# Patient Record
Sex: MALE | RURAL
[De-identification: names, ages, dates, MRNs, and addresses within clinical notes are randomized per-mention and may not be internally consistent; named-entity substitution may affect disease eponyms.]

---

## 2022-10-26 PROCEDURE — 88305 PATHOLOGY, DERMATOLOGY: ICD-10-PCS | Mod: 26,,, | Performed by: PATHOLOGY

## 2022-10-26 PROCEDURE — 88305 TISSUE EXAM BY PATHOLOGIST: CPT | Mod: 26,,, | Performed by: PATHOLOGY

## 2022-10-26 PROCEDURE — 88305 TISSUE EXAM BY PATHOLOGIST: CPT | Mod: SUR

## 2022-10-27 ENCOUNTER — LAB REQUISITION (OUTPATIENT)
Dept: LAB | Facility: HOSPITAL | Age: 57
End: 2022-10-27

## 2022-10-27 DIAGNOSIS — D49.2 NEOPLASM OF UNSPECIFIED BEHAVIOR OF BONE, SOFT TISSUE, AND SKIN: ICD-10-CM

## 2022-10-28 LAB
ESTROGEN SERPL-MCNC: NORMAL PG/ML
INSULIN SERPL-ACNC: NORMAL U[IU]/ML
LAB AP GROSS DESCRIPTION: NORMAL
LAB AP LABORATORY NOTES: NORMAL
LAB AP SPEC A DDX: NORMAL
LAB AP SPEC A MORPHOLOGY: NORMAL
LAB AP SPEC A PROCEDURE: NORMAL
LAB AP SPEC B DDX: NORMAL
LAB AP SPEC B MORPHOLOGY: NORMAL
LAB AP SPEC B PROCEDURE: NORMAL
T3RU NFR SERPL: NORMAL %

## 2022-11-17 PROCEDURE — 88305 TISSUE EXAM BY PATHOLOGIST: CPT | Mod: 26,,, | Performed by: PATHOLOGY

## 2022-11-17 PROCEDURE — 88305 TISSUE EXAM BY PATHOLOGIST: CPT | Mod: SUR | Performed by: DERMATOLOGY

## 2022-11-17 PROCEDURE — 88305 PATHOLOGY, DERMATOLOGY: ICD-10-PCS | Mod: 26,,, | Performed by: PATHOLOGY

## 2022-11-18 ENCOUNTER — LAB REQUISITION (OUTPATIENT)
Dept: LAB | Facility: HOSPITAL | Age: 57
End: 2022-11-18
Attending: DERMATOLOGY

## 2022-11-18 DIAGNOSIS — C44.49 OTHER SPECIFIED MALIGNANT NEOPLASM OF SKIN OF SCALP AND NECK: ICD-10-CM

## 2022-11-21 LAB
ESTROGEN SERPL-MCNC: NORMAL PG/ML
INSULIN SERPL-ACNC: NORMAL U[IU]/ML
LAB AP GROSS DESCRIPTION: NORMAL
LAB AP LABORATORY NOTES: NORMAL
LAB AP SPEC A DDX: NORMAL
LAB AP SPEC A MORPHOLOGY: NORMAL
LAB AP SPEC A PROCEDURE: NORMAL
T3RU NFR SERPL: NORMAL %

## 2023-10-26 PROCEDURE — 88305 TISSUE EXAM BY PATHOLOGIST: CPT | Mod: 26,,, | Performed by: PATHOLOGY

## 2023-10-26 PROCEDURE — 88305 TISSUE EXAM BY PATHOLOGIST: CPT | Mod: TC,SUR

## 2023-10-26 PROCEDURE — 88305 PATHOLOGY, DERMATOLOGY: ICD-10-PCS | Mod: 26,,, | Performed by: PATHOLOGY

## 2023-10-27 ENCOUNTER — LAB REQUISITION (OUTPATIENT)
Dept: LAB | Facility: HOSPITAL | Age: 58
End: 2023-10-27

## 2023-10-27 DIAGNOSIS — D49.2 NEOPLASM OF UNSPECIFIED BEHAVIOR OF BONE, SOFT TISSUE, AND SKIN: ICD-10-CM

## 2024-12-04 PROCEDURE — 88305 TISSUE EXAM BY PATHOLOGIST: CPT | Mod: TC,SUR | Performed by: DERMATOLOGY

## 2024-12-04 PROCEDURE — 88305 TISSUE EXAM BY PATHOLOGIST: CPT | Mod: 26,,, | Performed by: PATHOLOGY

## 2024-12-05 ENCOUNTER — LAB REQUISITION (OUTPATIENT)
Dept: LAB | Facility: HOSPITAL | Age: 59
End: 2024-12-05
Attending: DERMATOLOGY

## 2024-12-05 DIAGNOSIS — D04.62 CARCINOMA IN SITU OF SKIN OF LEFT UPPER LIMB, INCLUDING SHOULDER: ICD-10-CM

## 2025-02-03 ENCOUNTER — CLINICAL SUPPORT (OUTPATIENT)
Dept: CARDIOLOGY | Facility: CLINIC | Age: 60
End: 2025-02-03

## 2025-02-03 ENCOUNTER — OFFICE VISIT (OUTPATIENT)
Dept: ORTHOPEDICS | Facility: CLINIC | Age: 60
End: 2025-02-03

## 2025-02-03 ENCOUNTER — HOSPITAL ENCOUNTER (OUTPATIENT)
Dept: RADIOLOGY | Facility: HOSPITAL | Age: 60
Discharge: HOME | End: 2025-02-03
Attending: ORTHOPAEDIC SURGERY

## 2025-02-03 VITALS
HEART RATE: 93 BPM | WEIGHT: 280 LBS | BODY MASS INDEX: 34.82 KG/M2 | SYSTOLIC BLOOD PRESSURE: 132 MMHG | DIASTOLIC BLOOD PRESSURE: 87 MMHG | HEIGHT: 75 IN | OXYGEN SATURATION: 97 %

## 2025-02-03 DIAGNOSIS — S82.402A CLOSED FRACTURE OF LEFT TIBIA AND FIBULA, INITIAL ENCOUNTER: Primary | ICD-10-CM

## 2025-02-03 DIAGNOSIS — Z01.810 PRE-OPERATIVE CARDIOVASCULAR EXAMINATION: ICD-10-CM

## 2025-02-03 DIAGNOSIS — S82.202A CLOSED FRACTURE OF LEFT TIBIA AND FIBULA, INITIAL ENCOUNTER: ICD-10-CM

## 2025-02-03 DIAGNOSIS — Z01.812 PRE-OPERATIVE LABORATORY EXAMINATION: ICD-10-CM

## 2025-02-03 DIAGNOSIS — S82.142A CLOSED FRACTURE OF LEFT TIBIAL PLATEAU, INITIAL ENCOUNTER: ICD-10-CM

## 2025-02-03 DIAGNOSIS — S82.402A CLOSED FRACTURE OF LEFT TIBIA AND FIBULA, INITIAL ENCOUNTER: ICD-10-CM

## 2025-02-03 DIAGNOSIS — S82.202A CLOSED FRACTURE OF LEFT TIBIA AND FIBULA, INITIAL ENCOUNTER: Primary | ICD-10-CM

## 2025-02-03 PROCEDURE — 99999 PR PBB SHADOW E&M-EST. PATIENT-LVL II: CPT | Mod: PBBFAC,,,

## 2025-02-03 PROCEDURE — 99999 PR PBB SHADOW E&M-EST. PATIENT-LVL V: CPT | Mod: PBBFAC,,, | Performed by: ORTHOPAEDIC SURGERY

## 2025-02-03 PROCEDURE — 93010 ELECTROCARDIOGRAM REPORT: CPT | Mod: S$PBB,,, | Performed by: INTERNAL MEDICINE

## 2025-02-03 PROCEDURE — 93005 ELECTROCARDIOGRAM TRACING: CPT | Mod: PBBFAC | Performed by: INTERNAL MEDICINE

## 2025-02-03 PROCEDURE — 73700 CT LOWER EXTREMITY W/O DYE: CPT | Mod: TC,LT

## 2025-02-03 PROCEDURE — 99204 OFFICE O/P NEW MOD 45 MIN: CPT | Mod: S$PBB,,, | Performed by: ORTHOPAEDIC SURGERY

## 2025-02-03 PROCEDURE — 99212 OFFICE O/P EST SF 10 MIN: CPT | Mod: PBBFAC,25

## 2025-02-03 PROCEDURE — 73700 CT LOWER EXTREMITY W/O DYE: CPT | Mod: 26,LT,, | Performed by: RADIOLOGY

## 2025-02-03 PROCEDURE — 99215 OFFICE O/P EST HI 40 MIN: CPT | Mod: PBBFAC,25 | Performed by: ORTHOPAEDIC SURGERY

## 2025-02-03 RX ORDER — TRAMADOL HYDROCHLORIDE 50 MG/1
50 TABLET ORAL EVERY 6 HOURS
Status: ON HOLD | COMMUNITY
End: 2025-02-12 | Stop reason: HOSPADM
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
## 2025-02-03 NOTE — PATIENT INSTRUCTIONS
Your surgery is scheduled at [facility] in [place] for 2025    Pre-Op Testin2025    ___x____ Lab (Clinic Lab 1st Floor)  _______ Chest X-ray ([facility] 1st Floor)  ___x____ EKG (Clinic 2nd Floor)    *[facility] will contact you the day before surgery to give you the arrival time.    *A  is required to provide transportation for you the day of surgery.    *Do NOT eat or drink anything after midnight the night before your surgery.    *Bring all medications in their original bottles.    *Bring anything you may need for an overnight stay.     *Bathe with Hibiclens the night or morning before surgery.    *The morning of your surgery ONLY take blood pressure medications, heart medications, medications for acid reflux, and thyroid medications (the morning dose only).  *Take these medications with a sip of water.    *Do not take Insulin or Diabetic Medications the night before or the morning of your surgery, unless directed otherwise.    *Be sure that you have stopped blood thinners at the appropriate time, as instructed.  (_____ days prior to surgery)    *Bring your C-Pap machine if you have one.    *All jewelry and piercings MUST be removed prior to surgery.    *False eye lashes must be removed prior to surgery.    *Any questions regarding co-pays or deductibles with insurance, please contact the [facility] at #[phone].    *For Financial Assistance you may call #[phone] or #[phone].    Thank you for choosing Dr. Marco Culver for your Orthopedic needs.  We look forward to caring for you. If you have any questions, please contact our office at [phone].

## 2025-02-04 DIAGNOSIS — S82.142A TIBIAL PLATEAU FRACTURE, LEFT: Primary | ICD-10-CM

## 2025-02-04 LAB
OHS QRS DURATION: 110 MS
OHS QTC CALCULATION: 435 MS

## 2025-02-11 ENCOUNTER — HOSPITAL ENCOUNTER (OUTPATIENT)
Facility: HOSPITAL | Age: 60
Discharge: HOME HEALTH | End: 2025-02-12
Attending: ORTHOPAEDIC SURGERY | Admitting: ORTHOPAEDIC SURGERY

## 2025-02-11 ENCOUNTER — ANESTHESIA (OUTPATIENT)
Dept: SURGERY | Facility: HOSPITAL | Age: 60
End: 2025-02-11

## 2025-02-11 ENCOUNTER — ANESTHESIA EVENT (OUTPATIENT)
Dept: SURGERY | Facility: HOSPITAL | Age: 60
End: 2025-02-11

## 2025-02-11 DIAGNOSIS — S82.142A CLOSED FRACTURE OF LEFT TIBIAL PLATEAU: ICD-10-CM

## 2025-02-11 PROCEDURE — C1713 ANCHOR/SCREW BN/BN,TIS/BN: HCPCS | Performed by: ORTHOPAEDIC SURGERY

## 2025-02-11 PROCEDURE — 97161 PT EVAL LOW COMPLEX 20 MIN: CPT

## 2025-02-11 PROCEDURE — 99900035 HC TECH TIME PER 15 MIN (STAT)

## 2025-02-11 PROCEDURE — 27000165 HC TUBE, ETT CUFFED: Performed by: ANESTHESIOLOGY

## 2025-02-11 PROCEDURE — 63600175 PHARM REV CODE 636 W HCPCS: Performed by: ORTHOPAEDIC SURGERY

## 2025-02-11 PROCEDURE — D9220A PRA ANESTHESIA: Mod: ,,, | Performed by: ANESTHESIOLOGY

## 2025-02-11 PROCEDURE — 25000003 PHARM REV CODE 250: Performed by: ORTHOPAEDIC SURGERY

## 2025-02-11 PROCEDURE — 27000716 HC OXISENSOR PROBE, ANY SIZE: Performed by: ANESTHESIOLOGY

## 2025-02-11 PROCEDURE — C1769 GUIDE WIRE: HCPCS | Performed by: ORTHOPAEDIC SURGERY

## 2025-02-11 PROCEDURE — 63600175 PHARM REV CODE 636 W HCPCS: Performed by: NURSE ANESTHETIST, CERTIFIED REGISTERED

## 2025-02-11 PROCEDURE — 71000033 HC RECOVERY, INTIAL HOUR: Performed by: ORTHOPAEDIC SURGERY

## 2025-02-11 PROCEDURE — 36000711: Performed by: ORTHOPAEDIC SURGERY

## 2025-02-11 PROCEDURE — 36000710: Performed by: ORTHOPAEDIC SURGERY

## 2025-02-11 PROCEDURE — 71000039 HC RECOVERY, EACH ADD'L HOUR: Performed by: ORTHOPAEDIC SURGERY

## 2025-02-11 PROCEDURE — 27800903 OPTIME MED/SURG SUP & DEVICES OTHER IMPLANTS: Performed by: ORTHOPAEDIC SURGERY

## 2025-02-11 PROCEDURE — 25000003 PHARM REV CODE 250: Performed by: NURSE ANESTHETIST, CERTIFIED REGISTERED

## 2025-02-11 PROCEDURE — 94761 N-INVAS EAR/PLS OXIMETRY MLT: CPT

## 2025-02-11 PROCEDURE — 27000655: Performed by: ANESTHESIOLOGY

## 2025-02-11 PROCEDURE — 37000008 HC ANESTHESIA 1ST 15 MINUTES: Performed by: ORTHOPAEDIC SURGERY

## 2025-02-11 PROCEDURE — 27000510 HC BLANKET BAIR HUGGER ANY SIZE: Performed by: ANESTHESIOLOGY

## 2025-02-11 PROCEDURE — 63600175 PHARM REV CODE 636 W HCPCS: Mod: JZ,TB | Performed by: NURSE ANESTHETIST, CERTIFIED REGISTERED

## 2025-02-11 PROCEDURE — 27535 TREAT KNEE FRACTURE: CPT | Mod: LT,,, | Performed by: ORTHOPAEDIC SURGERY

## 2025-02-11 PROCEDURE — 94799 UNLISTED PULMONARY SVC/PX: CPT

## 2025-02-11 PROCEDURE — 27201423 OPTIME MED/SURG SUP & DEVICES STERILE SUPPLY: Performed by: ORTHOPAEDIC SURGERY

## 2025-02-11 PROCEDURE — 27000689 HC BLADE LARYNGOSCOPE ANY SIZE: Performed by: ANESTHESIOLOGY

## 2025-02-11 PROCEDURE — 37000009 HC ANESTHESIA EA ADD 15 MINS: Performed by: ORTHOPAEDIC SURGERY

## 2025-02-11 PROCEDURE — 27000177 HC AIRWAY, LARYNGEAL MASK: Performed by: ANESTHESIOLOGY

## 2025-02-11 DEVICE — SCREW BONE CORTICAL 3.5X42MM T: Type: IMPLANTABLE DEVICE | Site: LEG | Status: FUNCTIONAL

## 2025-02-11 DEVICE — IMPLANTABLE DEVICE: Type: IMPLANTABLE DEVICE | Site: LEG | Status: FUNCTIONAL

## 2025-02-11 DEVICE — SCREW BONE CORTICAL 3.5X48MM T: Type: IMPLANTABLE DEVICE | Site: LEG | Status: FUNCTIONAL

## 2025-02-11 DEVICE — SCREW AXSOS CANC FT TI 4X70MM: Type: IMPLANTABLE DEVICE | Site: LEG | Status: FUNCTIONAL

## 2025-02-11 DEVICE — SCREW AXSOS CORTEX TI 3.5X80MM: Type: IMPLANTABLE DEVICE | Site: LEG | Status: FUNCTIONAL

## 2025-02-11 DEVICE — SCREW BONE AXSOS 4X75MM TI: Type: IMPLANTABLE DEVICE | Site: LEG | Status: FUNCTIONAL

## 2025-02-11 DEVICE — SCREW BONE AXSOS 4X70MM TI: Type: IMPLANTABLE DEVICE | Site: LEG | Status: FUNCTIONAL

## 2025-02-11 RX ORDER — BISACODYL 10 MG/1
10 SUPPOSITORY RECTAL DAILY PRN
Status: DISCONTINUED | OUTPATIENT
Start: 2025-02-11 | End: 2025-02-12 | Stop reason: HOSPADM

## 2025-02-11 RX ORDER — ROCURONIUM BROMIDE 10 MG/ML
INJECTION, SOLUTION INTRAVENOUS
Status: DISCONTINUED | OUTPATIENT
Start: 2025-02-11 | End: 2025-02-11

## 2025-02-11 RX ORDER — HYDROMORPHONE HYDROCHLORIDE 2 MG/ML
INJECTION, SOLUTION INTRAMUSCULAR; INTRAVENOUS; SUBCUTANEOUS
Status: DISCONTINUED | OUTPATIENT
Start: 2025-02-11 | End: 2025-02-11

## 2025-02-11 RX ORDER — MIDAZOLAM HYDROCHLORIDE 1 MG/ML
INJECTION INTRAMUSCULAR; INTRAVENOUS
Status: DISCONTINUED | OUTPATIENT
Start: 2025-02-11 | End: 2025-02-11

## 2025-02-11 RX ORDER — PHENYLEPHRINE HYDROCHLORIDE 10 MG/ML
INJECTION INTRAVENOUS
Status: DISCONTINUED | OUTPATIENT
Start: 2025-02-11 | End: 2025-02-11

## 2025-02-11 RX ORDER — LABETALOL HYDROCHLORIDE 5 MG/ML
7.5 INJECTION, SOLUTION INTRAVENOUS ONCE
Status: COMPLETED | OUTPATIENT
Start: 2025-02-11 | End: 2025-02-11

## 2025-02-11 RX ORDER — SODIUM CHLORIDE, SODIUM LACTATE, POTASSIUM CHLORIDE, CALCIUM CHLORIDE 600; 310; 30; 20 MG/100ML; MG/100ML; MG/100ML; MG/100ML
125 INJECTION, SOLUTION INTRAVENOUS CONTINUOUS
Status: DISCONTINUED | OUTPATIENT
Start: 2025-02-11 | End: 2025-02-12

## 2025-02-11 RX ORDER — PROPOFOL 10 MG/ML
VIAL (ML) INTRAVENOUS
Status: DISCONTINUED | OUTPATIENT
Start: 2025-02-11 | End: 2025-02-11

## 2025-02-11 RX ORDER — CEFAZOLIN SODIUM 1 G/3ML
INJECTION, POWDER, FOR SOLUTION INTRAMUSCULAR; INTRAVENOUS
Status: DISCONTINUED | OUTPATIENT
Start: 2025-02-11 | End: 2025-02-11

## 2025-02-11 RX ORDER — CEFAZOLIN 2 G/1
2 INJECTION, POWDER, FOR SOLUTION INTRAMUSCULAR; INTRAVENOUS
Status: DISCONTINUED | OUTPATIENT
Start: 2025-02-11 | End: 2025-02-11 | Stop reason: HOSPADM

## 2025-02-11 RX ORDER — IPRATROPIUM BROMIDE AND ALBUTEROL SULFATE 2.5; .5 MG/3ML; MG/3ML
3 SOLUTION RESPIRATORY (INHALATION)
Status: DISCONTINUED | OUTPATIENT
Start: 2025-02-11 | End: 2025-02-11 | Stop reason: HOSPADM

## 2025-02-11 RX ORDER — ONDANSETRON HYDROCHLORIDE 2 MG/ML
4 INJECTION, SOLUTION INTRAVENOUS EVERY 8 HOURS PRN
Status: DISCONTINUED | OUTPATIENT
Start: 2025-02-11 | End: 2025-02-12 | Stop reason: HOSPADM

## 2025-02-11 RX ORDER — CEFAZOLIN 2 G/1
2 INJECTION, POWDER, FOR SOLUTION INTRAMUSCULAR; INTRAVENOUS
Status: COMPLETED | OUTPATIENT
Start: 2025-02-11 | End: 2025-02-12

## 2025-02-11 RX ORDER — MORPHINE SULFATE 10 MG/ML
4 INJECTION INTRAMUSCULAR; INTRAVENOUS; SUBCUTANEOUS EVERY 5 MIN PRN
Status: DISCONTINUED | OUTPATIENT
Start: 2025-02-11 | End: 2025-02-11 | Stop reason: HOSPADM

## 2025-02-11 RX ORDER — LIDOCAINE HYDROCHLORIDE 20 MG/ML
INJECTION INTRAVENOUS
Status: DISCONTINUED | OUTPATIENT
Start: 2025-02-11 | End: 2025-02-11

## 2025-02-11 RX ORDER — OXYCODONE HYDROCHLORIDE 5 MG/1
5 TABLET ORAL
Status: DISCONTINUED | OUTPATIENT
Start: 2025-02-11 | End: 2025-02-11 | Stop reason: HOSPADM

## 2025-02-11 RX ORDER — HYDROMORPHONE HYDROCHLORIDE 2 MG/ML
0.5 INJECTION, SOLUTION INTRAMUSCULAR; INTRAVENOUS; SUBCUTANEOUS EVERY 5 MIN PRN
Status: DISCONTINUED | OUTPATIENT
Start: 2025-02-11 | End: 2025-02-11 | Stop reason: HOSPADM

## 2025-02-11 RX ORDER — SUCCINYLCHOLINE CHLORIDE 20 MG/ML
INJECTION INTRAMUSCULAR; INTRAVENOUS
Status: DISCONTINUED | OUTPATIENT
Start: 2025-02-11 | End: 2025-02-11

## 2025-02-11 RX ORDER — SODIUM CHLORIDE 9 MG/ML
INJECTION, SOLUTION INTRAVENOUS CONTINUOUS
Status: DISCONTINUED | OUTPATIENT
Start: 2025-02-11 | End: 2025-02-12

## 2025-02-11 RX ORDER — HYDROCODONE BITARTRATE AND ACETAMINOPHEN 5; 325 MG/1; MG/1
1 TABLET ORAL EVERY 4 HOURS PRN
Status: DISCONTINUED | OUTPATIENT
Start: 2025-02-11 | End: 2025-02-12 | Stop reason: HOSPADM

## 2025-02-11 RX ORDER — FENTANYL CITRATE 50 UG/ML
INJECTION, SOLUTION INTRAMUSCULAR; INTRAVENOUS
Status: DISCONTINUED | OUTPATIENT
Start: 2025-02-11 | End: 2025-02-11

## 2025-02-11 RX ORDER — ONDANSETRON HYDROCHLORIDE 2 MG/ML
4 INJECTION, SOLUTION INTRAVENOUS DAILY PRN
Status: DISCONTINUED | OUTPATIENT
Start: 2025-02-11 | End: 2025-02-11 | Stop reason: HOSPADM

## 2025-02-11 RX ORDER — DIPHENHYDRAMINE HYDROCHLORIDE 50 MG/ML
25 INJECTION INTRAMUSCULAR; INTRAVENOUS EVERY 6 HOURS PRN
Status: DISCONTINUED | OUTPATIENT
Start: 2025-02-11 | End: 2025-02-11 | Stop reason: HOSPADM

## 2025-02-11 RX ORDER — ONDANSETRON HYDROCHLORIDE 2 MG/ML
INJECTION, SOLUTION INTRAVENOUS
Status: DISCONTINUED | OUTPATIENT
Start: 2025-02-11 | End: 2025-02-11

## 2025-02-11 RX ORDER — DEXAMETHASONE SODIUM PHOSPHATE 4 MG/ML
INJECTION, SOLUTION INTRA-ARTICULAR; INTRALESIONAL; INTRAMUSCULAR; INTRAVENOUS; SOFT TISSUE
Status: DISCONTINUED | OUTPATIENT
Start: 2025-02-11 | End: 2025-02-11

## 2025-02-11 RX ORDER — DOCUSATE SODIUM 100 MG/1
100 CAPSULE, LIQUID FILLED ORAL EVERY 12 HOURS
Status: DISCONTINUED | OUTPATIENT
Start: 2025-02-11 | End: 2025-02-12 | Stop reason: HOSPADM

## 2025-02-11 RX ORDER — SODIUM CHLORIDE 9 MG/ML
INJECTION, SOLUTION INTRAVENOUS CONTINUOUS
Status: DISCONTINUED | OUTPATIENT
Start: 2025-02-11 | End: 2025-02-12 | Stop reason: HOSPADM

## 2025-02-11 RX ORDER — MORPHINE SULFATE 4 MG/ML
4 INJECTION, SOLUTION INTRAMUSCULAR; INTRAVENOUS EVERY 4 HOURS PRN
Status: DISCONTINUED | OUTPATIENT
Start: 2025-02-11 | End: 2025-02-12 | Stop reason: HOSPADM

## 2025-02-11 RX ADMIN — ONDANSETRON 8 MG: 2 INJECTION INTRAMUSCULAR; INTRAVENOUS at 03:02

## 2025-02-11 RX ADMIN — HYDROMORPHONE HYDROCHLORIDE 0.5 MG: 2 INJECTION, SOLUTION INTRAMUSCULAR; INTRAVENOUS; SUBCUTANEOUS at 05:02

## 2025-02-11 RX ADMIN — MIDAZOLAM HYDROCHLORIDE 2 MG: 1 INJECTION, SOLUTION INTRAMUSCULAR; INTRAVENOUS at 03:02

## 2025-02-11 RX ADMIN — LIDOCAINE HYDROCHLORIDE 100 MG: 20 INJECTION, SOLUTION INTRAVENOUS at 03:02

## 2025-02-11 RX ADMIN — PHENYLEPHRINE HYDROCHLORIDE 100 MCG: 10 INJECTION INTRAVENOUS at 03:02

## 2025-02-11 RX ADMIN — HYDROMORPHONE HYDROCHLORIDE 1 MG: 2 INJECTION, SOLUTION INTRAMUSCULAR; INTRAVENOUS; SUBCUTANEOUS at 05:02

## 2025-02-11 RX ADMIN — SODIUM CHLORIDE: 9 INJECTION, SOLUTION INTRAVENOUS at 12:02

## 2025-02-11 RX ADMIN — DOCUSATE SODIUM 100 MG: 100 CAPSULE, LIQUID FILLED ORAL at 08:02

## 2025-02-11 RX ADMIN — ROCURONIUM BROMIDE 30 MG: 10 INJECTION, SOLUTION INTRAVENOUS at 03:02

## 2025-02-11 RX ADMIN — PHENYLEPHRINE HYDROCHLORIDE 200 MCG: 10 INJECTION INTRAVENOUS at 04:02

## 2025-02-11 RX ADMIN — SODIUM CHLORIDE: 9 INJECTION, SOLUTION INTRAVENOUS at 05:02

## 2025-02-11 RX ADMIN — PROPOFOL 200 MG: 10 INJECTION, EMULSION INTRAVENOUS at 03:02

## 2025-02-11 RX ADMIN — SUCCINYLCHOLINE CHLORIDE 100 MG: 20 INJECTION, SOLUTION INTRAMUSCULAR; INTRAVENOUS; PARENTERAL at 03:02

## 2025-02-11 RX ADMIN — DEXAMETHASONE SODIUM PHOSPHATE 8 MG: 4 INJECTION, SOLUTION INTRA-ARTICULAR; INTRALESIONAL; INTRAMUSCULAR; INTRAVENOUS; SOFT TISSUE at 03:02

## 2025-02-11 RX ADMIN — FENTANYL CITRATE 100 MCG: 50 INJECTION, SOLUTION INTRAMUSCULAR; INTRAVENOUS at 04:02

## 2025-02-11 RX ADMIN — LABETALOL HYDROCHLORIDE 7.5 MG: 5 INJECTION, SOLUTION INTRAVENOUS at 07:02

## 2025-02-11 RX ADMIN — FENTANYL CITRATE 100 MCG: 50 INJECTION, SOLUTION INTRAMUSCULAR; INTRAVENOUS at 03:02

## 2025-02-11 RX ADMIN — SODIUM CHLORIDE: 9 INJECTION, SOLUTION INTRAVENOUS at 07:02

## 2025-02-11 RX ADMIN — CEFAZOLIN 2 G: 2 INJECTION, POWDER, FOR SOLUTION INTRAMUSCULAR; INTRAVENOUS at 10:02

## 2025-02-11 RX ADMIN — HYDROMORPHONE HYDROCHLORIDE 0.5 MG: 2 INJECTION, SOLUTION INTRAMUSCULAR; INTRAVENOUS; SUBCUTANEOUS at 06:02

## 2025-02-11 RX ADMIN — CEFAZOLIN 3 G: 1 INJECTION, POWDER, FOR SOLUTION INTRAMUSCULAR; INTRAVENOUS; PARENTERAL at 03:02

## 2025-02-11 NOTE — INTERVAL H&P NOTE
The patient has been examined and the H&P has been reviewed:    I concur with the findings and no changes have occurred since H&P was written.    Surgery risks, benefits and alternative options discussed and understood by patient/family.          Active Hospital Problems    Diagnosis  POA    *Closed fracture of left tibial plateau [S88.744A]  Yes      Resolved Hospital Problems   No resolved problems to display.

## 2025-02-11 NOTE — OP NOTE
[facility] - Orthopedic Periop Services  General Surgery  Operative Note    SUMMARY     Date of Procedure: 2/11/2025     Procedure: Procedure(s) (LRB):  ORIF, FRACTURE, TIBIA, PLATEAU WITH ALLOGRAFT (Left)       Surgeons and Role:     * Marco Culver MD - Primary    Assisting Surgeon: None    Pre-Operative Diagnosis: Closed fracture of left tibia and fibula, initial encounter [S82.202A, S82.402A]    Post-Operative Diagnosis: Post-Op Diagnosis Codes:     * Closed fracture of left tibia and fibula, initial encounter [S82.202A, S82.402A]    Anesthesia: General    Operative Findings (including complications, if any):  After having risks and benefits of procedure explained at length the patient stating understands risks benefits written informed consent was obtained patient was taken operating room placed in supine position on operative table anesthesia induced per Anesthesia.  Tourniquet placed over cast padding on proximal left thigh.  Left lower extremity prepped draped sterile fashion.  Leg was then elevated exsanguinated Esmarch bandage tourniquet was up for total of 1 112 minutes.  At this time a hockey stick shaped incision was made going from the proximal tibia to the joint line and then curving posteriorly the joint line to a proximally the fibular head.  Skin incision made with a 15. Blade careful dissection down to the fascia was performed using pickups and scissors.  At this time the fascia was split slightly lateral to the anterior spine of the tibia.  It was then taken down off the proximal tibial plateau where there was noted be a comminuted fracture.  At this time the fracture fragments were identified in the articular depressed fragment was identified and elevated.  There were more than 5 fragments to the articular surface.  Once the articular surface had been elevated it was then supported with graft from [employer] synthetic structural graft was placed.  Fracture fragments then reduced and a 4  hole proximal tibial plateau plate lateral from [employer] was then secured with locking and nonlocking screws all screw lengths confirmed under fluoroscopic vision the articular surface had been restored back to its normal height and the articular surface restored at this time wound irrigated out copious amounts normal saline tourniquet let down hemostasis maintained Bovie electrocautery the proximal fascia was brought back over the plate proximally the anterior fascia was left open at this time subcuticular 2-0 Vicryl followed by skin staples closed the skin were placed.  The hemostasis been maintained.  The compartments were noted to be soft.  At this time sterile occlusive dressing placed followed by knee immobilizer.  Patient was awakened taken recovery room in good condition.  All counts were correct.  No complications.    Description of Technical Procedures:     Significant Surgical Tasks Conducted by the Assistant(s), if Applicable:     Estimated Blood Loss (EBL): * No values recorded between 2/11/2025  3:55 PM and 2/11/2025  5:59 PM *50cc           Implants:   Implant Name Type Inv. Item Serial No.  Lot No. LRB No. Used Action   GRANULES CHRONOS  10CC - [id number]  GR[employer] CH[employer]OS LG 10CC  [facility] [id number] Left 1 Implanted       Specimens:   Specimen (24h ago, onward)      None                    Condition: Good    Disposition: PACU - hemodynamically stable.    Attestation: I was present and scrubbed for the entire procedure.

## 2025-02-11 NOTE — ANESTHESIA PROCEDURE NOTES
Intubation    Date/Time: 2/11/2025 3:20 PM    Performed by: Alfredo Miranda CRNA  Authorized by: Silvio Rutledge MD    Intubation:     Induction:  Intravenous    Intubated:  Postinduction    Mask Ventilation:  Not attempted    Attempts:  1    Attempted By:  CRNA    Difficult Airway Encountered?: No      Complications:  None    Airway Device:  Supraglottic airway/LMA    Airway Device Size:  4.0    Style/Cuff Inflation:  Cuffed (inflated to minimal occlusive pressure)    Placement Verified By:  Capnometry    Complicating Factors:  None    Findings Post-Intubation:  BS equal bilateral and atraumatic/condition of teeth unchanged

## 2025-02-11 NOTE — PT/OT/SLP EVAL
Physical Therapy Evaluation    Patient Name:  Rufus Preston   MRN:  [id number]    Recommendations:     Discharge Recommendations: No Therapy Indicated   Discharge Equipment Recommendations: none   Barriers to discharge: None    Assessment:     Rufus Preston is a 59 y.o. male admitted with a medical diagnosis of Closed fracture of left tibial plateau.  He presents with the following impairments/functional limitations: orthopedic precautions Patient with good understanding of TTWB and immobilization. Able to ambulate using crutches.    Rehab Prognosis: Good; patient would benefit from acute skilled PT services to address these deficits and reach maximum level of function.    Recent Surgery: Procedure(s) (LRB):  ORIF, FRACTURE, TIBIA, PLATEAU WITH ALLOGRAFT (Left) Day of Surgery    Plan:     During this hospitalization, patient to be seen 1 x/week to address the identified rehab impairments via gait training and progress toward the following goals:    Plan of Care Expires:       Subjective     Chief Complaint: left knee pain  Patient/Family Comments/goals: Patient has been using crutches ttwb since injury  Pain/Comfort:  Pain Rating 1: 4/10  Location - Side 1: Left  Location 1: knee  Pain Addressed 1: Cessation of Activity, Pre-medicate for activity    Patients cultural, spiritual, Rastafarian conflicts given the current situation: no    Living Environment:  Lives with spouse, ramp entering home  Prior to admission, patients level of function was independent.  Equipment used at home: crutches.  DME owned (not currently used): none.  Upon discharge, patient will have assistance from family.    Objective:     Communicated with nurse prior to session.  Patient found supine with    upon PT entry to room.    General Precautions: Standard, fall  Orthopedic Precautions:LLE non weight bearing   Braces: Knee immobilizer  Respiratory Status: Room air    Exams:  Left knee immobilized    Functional Mobility:  Gait: ambulated 20 feet  with crutches ttwb      AM-PAC 6 CLICK MOBILITY  Total Score:24       Treatment & Education:  Need for knee immobilization  Left knee ttwb    Patient left supine with call button in reach.    GOALS:   Multidisciplinary Problems       Physical Therapy Goals       Not on file                    DME Justifications:  No DME recommended requiring DME justifications    History:     History reviewed. No pertinent past medical history.    History reviewed. No pertinent surgical history.    Time Tracking:     PT Received On: 02/11/25  PT Start Time: 1330     PT Stop Time: 1350  PT Total Time (min): 20 min     Billable Minutes: Evaluation 20 02/11/2025

## 2025-02-11 NOTE — PROGRESS NOTES
Department of Orthopedic Surgery    History and Physical       Principal Problem: Closed fracture of left tibia and fibula, initial encounter [S82.202A, S82.402A]           HISTORY:  59-year-old male who sustained an on left lateral tibial plateau fracture with depression that does go crossed of the medial plateau needing open reduction internal fixation in his left tibial plateau    PAST MEDICAL HISTORY: History reviewed. No pertinent past medical history.     PAST SURGICAL HISTORY: History reviewed. No pertinent surgical history.       ALLERGIES: Review of patient's allergies indicates:  No Known Allergies       MEDICATIONS: (Not in a hospital admission)       SOCIAL HISTORY:   Social History     Socioeconomic History    Marital status: Unknown   Tobacco Use    Smoking status: Never    Smokeless tobacco: Never   Substance and Sexual Activity    Alcohol use: Not Currently    Drug use: Not Currently    Sexual activity: Yes          FAMILY HISTORY: No family history on file.       PHYSICAL EXAM:   Vitals:    02/03/25 1020   BP: 132/87   Pulse: 93     Body mass index is 35 kg/m².     In general, this is a well-developed, well-nourished male . The patient is alert, oriented and cooperative.      HEENT:  Normocephalic, atraumatic.  Extraocular movements are intact bilaterally.  The oropharynx is benign.       NECK:  Nontender with good range of motion.      LUNGS:  Clear to auscultation bilaterally.      HEART:  Demonstrates a regular rate and rhythm.  No murmurs appreciated.      ABDOMEN:  Soft, non-tender, non-distended.        EXTREMITIES:  Left lower extremity has bruising and ecchymosis about the proximal tibia.  Very tender to palpation.  Pain on motion.  He does move his toes has sensation to touch has palpable pulses in his left lower extremity he is able to dorsiflexion of the toes and ankle      RADIOGRAPHIC FINDINGS:  X-rays show a depressed comminuted fracture of the left tibial plateau with the  extension to the medial side      IMPRESSION:  Comminuted displaced tibial plateau fracture on the left      PLAN:  Open reduction internal fixation with grafting left tibial plateau fracture    I had a long discussion with the patient about treatment options, including operative and nonoperative treatments. We discussed pros and cons of each including risks pertinent to surgery including pain, infection, bleeding, damage to adjacent structures like nerves and blood vessels, failure to heal, need for future surgeries, stiffness, instability, loss of limb, anesthesia risks like stroke, blood clot, loss of life. We discussed the possibility of need for later hardware removal in the case that hardware was used. We discussed common and uncommon risks, and discussed patient specific factors that may increase the risks present with surgery. All questions were answered. The patient expressed understanding of the pros and cons of surgery and wanted to proceed with surgical treatment.        (Subject to voice recognition error, transcription service not allowed)

## 2025-02-11 NOTE — ANESTHESIA PREPROCEDURE EVALUATION
02/11/2025  Rufus Preston is a 59 y.o., male.      Pre-op Assessment    I have reviewed the Patient Summary Reports.     I have reviewed the Nursing Notes. I have reviewed the NPO Status.   I have reviewed the Medications.     Review of Systems  Anesthesia Hx:  No problems with previous Anesthesia                Social:  Non-Smoker, No Alcohol Use       Hematology/Oncology:  Hematology Normal   Oncology Normal                                   EENT/Dental:  EENT/Dental Normal           Cardiovascular:  Cardiovascular Normal                                              Pulmonary:  Pulmonary Normal                       Renal/:  Renal/ Normal                 Hepatic/GI:  Hepatic/GI Normal                    Musculoskeletal:  Musculoskeletal Normal    Fx tibia, LT            Neurological:  Neurology Normal                                      Endocrine:  Endocrine Normal            Dermatological:  Skin Normal    Psych:  Psychiatric Normal                    Physical Exam  General: Well nourished    Airway:  Mallampati: II / II  Mouth Opening: Normal  TM Distance: > 6 cm  Tongue: Normal  Neck ROM: Normal ROM    Chest/Lungs:  Clear to auscultation, Normal Respiratory Rate    Heart:  Rate: Normal  Rhythm: Regular Rhythm        Anesthesia Plan  Type of Anesthesia, risks & benefits discussed:    Anesthesia Type: Gen Supraglottic Airway  Intra-op Monitoring Plan: Standard ASA Monitors  Post Op Pain Control Plan: multimodal analgesia  Induction:  IV  Informed Consent: Informed consent signed with the Patient and all parties understand the risks and agree with anesthesia plan.  All questions answered. Patient consented to blood products? Yes  ASA Score: 2  Day of Surgery Review of History & Physical: H&P Update referred to the surgeon/provider.I have interviewed and examined the patient. I have reviewed the  patient's H&P dated:     Ready For Surgery From Anesthesia Perspective.     .

## 2025-02-12 VITALS
HEIGHT: 75 IN | DIASTOLIC BLOOD PRESSURE: 73 MMHG | TEMPERATURE: 99 F | HEART RATE: 76 BPM | BODY MASS INDEX: 35.31 KG/M2 | SYSTOLIC BLOOD PRESSURE: 129 MMHG | WEIGHT: 284 LBS | RESPIRATION RATE: 20 BRPM | OXYGEN SATURATION: 97 %

## 2025-02-12 LAB
ALBUMIN SERPL BCP-MCNC: 3.7 G/DL (ref 3.4–4.8)
ALBUMIN/GLOB SERPL: 1.3 {RATIO}
ALP SERPL-CCNC: 139 U/L (ref 40–150)
ALT SERPL W P-5'-P-CCNC: 17 U/L
ANION GAP SERPL CALCULATED.3IONS-SCNC: 14 MMOL/L (ref 7–16)
AST SERPL W P-5'-P-CCNC: 29 U/L (ref 5–34)
BASOPHILS # BLD AUTO: 0.02 K/UL (ref 0–0.2)
BASOPHILS NFR BLD AUTO: 0.2 % (ref 0–1)
BILIRUB SERPL-MCNC: 0.7 MG/DL
BUN SERPL-MCNC: 16 MG/DL (ref 8–26)
BUN/CREAT SERPL: 16 (ref 6–20)
CALCIUM SERPL-MCNC: 8.7 MG/DL (ref 8.8–10)
CHLORIDE SERPL-SCNC: 107 MMOL/L (ref 98–107)
CO2 SERPL-SCNC: 22 MMOL/L (ref 23–31)
CREAT SERPL-MCNC: 1 MG/DL (ref 0.72–1.25)
DIFFERENTIAL METHOD BLD: ABNORMAL
EGFR (NO RACE VARIABLE) (RUSH/TITUS): 86 ML/MIN/1.73M2
EOSINOPHIL # BLD AUTO: 0 K/UL (ref 0–0.5)
EOSINOPHIL NFR BLD AUTO: 0 % (ref 1–4)
ERYTHROCYTE [DISTWIDTH] IN BLOOD BY AUTOMATED COUNT: 11.9 % (ref 11.5–14.5)
GLOBULIN SER-MCNC: 2.9 G/DL (ref 2–4)
GLUCOSE SERPL-MCNC: 124 MG/DL (ref 82–115)
HCT VFR BLD AUTO: 38.7 % (ref 40–54)
HGB BLD-MCNC: 13.1 G/DL (ref 13.5–18)
IMM GRANULOCYTES # BLD AUTO: 0.07 K/UL (ref 0–0.04)
IMM GRANULOCYTES NFR BLD: 0.6 % (ref 0–0.4)
LYMPHOCYTES # BLD AUTO: 1.11 K/UL (ref 1–4.8)
LYMPHOCYTES NFR BLD AUTO: 9.2 % (ref 27–41)
MCH RBC QN AUTO: 29.6 PG (ref 27–31)
MCHC RBC AUTO-ENTMCNC: 33.9 G/DL (ref 32–36)
MCV RBC AUTO: 87.4 FL (ref 80–96)
MONOCYTES # BLD AUTO: 0.77 K/UL (ref 0–0.8)
MONOCYTES NFR BLD AUTO: 6.4 % (ref 2–6)
MPC BLD CALC-MCNC: 10.9 FL (ref 9.4–12.4)
NEUTROPHILS # BLD AUTO: 10.15 K/UL (ref 1.8–7.7)
NEUTROPHILS NFR BLD AUTO: 83.6 % (ref 53–65)
NRBC # BLD AUTO: 0 X10E3/UL
NRBC, AUTO (.00): 0 %
PLATELET # BLD AUTO: 301 K/UL (ref 150–400)
POTASSIUM SERPL-SCNC: 5.1 MMOL/L (ref 3.5–5.1)
PROT SERPL-MCNC: 6.6 G/DL (ref 5.8–7.6)
RBC # BLD AUTO: 4.43 M/UL (ref 4.6–6.2)
SODIUM SERPL-SCNC: 138 MMOL/L (ref 136–145)
WBC # BLD AUTO: 12.12 K/UL (ref 4.5–11)

## 2025-02-12 PROCEDURE — 85025 COMPLETE CBC W/AUTO DIFF WBC: CPT | Performed by: ORTHOPAEDIC SURGERY

## 2025-02-12 PROCEDURE — 80053 COMPREHEN METABOLIC PANEL: CPT | Performed by: ORTHOPAEDIC SURGERY

## 2025-02-12 PROCEDURE — 97116 GAIT TRAINING THERAPY: CPT

## 2025-02-12 PROCEDURE — 36415 COLL VENOUS BLD VENIPUNCTURE: CPT | Performed by: ORTHOPAEDIC SURGERY

## 2025-02-12 PROCEDURE — 25000003 PHARM REV CODE 250: Performed by: ORTHOPAEDIC SURGERY

## 2025-02-12 PROCEDURE — 63600175 PHARM REV CODE 636 W HCPCS: Performed by: ORTHOPAEDIC SURGERY

## 2025-02-12 PROCEDURE — 97165 OT EVAL LOW COMPLEX 30 MIN: CPT

## 2025-02-12 RX ORDER — LABETALOL HYDROCHLORIDE 5 MG/ML
20 INJECTION, SOLUTION INTRAVENOUS ONCE
Status: COMPLETED | OUTPATIENT
Start: 2025-02-12 | End: 2025-02-12

## 2025-02-12 RX ORDER — NAPROXEN SODIUM 220 MG/1
81 TABLET, FILM COATED ORAL DAILY
COMMUNITY
Start: 2025-02-12 | End: 2025-03-14

## 2025-02-12 RX ORDER — NAPROXEN SODIUM 220 MG/1
81 TABLET, FILM COATED ORAL 3 TIMES DAILY
Status: DISCONTINUED | OUTPATIENT
Start: 2025-02-12 | End: 2025-02-12 | Stop reason: HOSPADM

## 2025-02-12 RX ORDER — HYDROCODONE BITARTRATE AND ACETAMINOPHEN 10; 325 MG/1; MG/1
1 TABLET ORAL EVERY 6 HOURS PRN
Qty: 28 TABLET | Refills: 0 | Status: SHIPPED | OUTPATIENT
Start: 2025-02-12 | End: 2025-02-19

## 2025-02-12 RX ADMIN — DOCUSATE SODIUM 100 MG: 100 CAPSULE, LIQUID FILLED ORAL at 08:02

## 2025-02-12 RX ADMIN — CEFAZOLIN 2 G: 2 INJECTION, POWDER, FOR SOLUTION INTRAMUSCULAR; INTRAVENOUS at 06:02

## 2025-02-12 RX ADMIN — HYDROCODONE BITARTRATE AND ACETAMINOPHEN 1 TABLET: 5; 325 TABLET ORAL at 03:02

## 2025-02-12 RX ADMIN — HYDROCODONE BITARTRATE AND ACETAMINOPHEN 1 TABLET: 5; 325 TABLET ORAL at 09:02

## 2025-02-12 RX ADMIN — ASPIRIN 81 MG CHEWABLE TABLET 81 MG: 81 TABLET CHEWABLE at 08:02

## 2025-02-12 RX ADMIN — LABETALOL HYDROCHLORIDE 20 MG: 5 INJECTION, SOLUTION INTRAVENOUS at 08:02

## 2025-02-12 NOTE — NURSING
Discharge instructions reviewed with patient and spouse; and copy given to patient. Patient and spouse voiced understanding regarding:meds, appt., signs and symptoms to report to physician.   Reviewed dressing change instructions with patients daughter emmy echols rn.  Change dressing  Home Health / [facility] nurse:  Change dressing post op day #3. Clean with Chloraprep, apply Aquacel AG dressing to incision. Apply Mepilex dressing to hemovac removal site. Contact physician if any wound drainage noted.  Continue DERECK hose on lower extremities, may remove twice day for one hour then replace. Dc staples in 2 weeks from surgery and steristrip incision. CONTINUE INCENTIVE SPIROMETER BID    *Notify your healthcare provider if you experience any of the following: temperature >100.4  *Notify your healthcare provider if you experience any of the following: redness, tenderness, or any signs or symptoms of infection (pain, swelling, redness, odor or green/yellow drainage around incision site).  *Notify your healthcare provider if you experience any of the following: difficulty breathing or increased cough.  *Notify your physician if you experience any persistent nausea, vomiting, diarrhea or headache.  *Notify your physician if you experience any of the following: severe uncontrolled pain, worsening rash, increased confusion, weakness, dizziness, lightheadedness, or visual disturbances.

## 2025-02-12 NOTE — PLAN OF CARE
Problem: Occupational Therapy  Goal: Occupational Therapy Goal  Description: STG:  Pt will perform grooming (I)  Pt will bathe with min a  Pt will perform UE dressing with (I)  Pt will perform LE dressing with I/Mod I  Pt will transfer bed/chair/bsc with Mod I with crutches  Pt will perform standing task x 1 min with (S)  Pt will tolerate 20 minutes of tx without fatigue      LT.Restore to max I with self care and mobility.     Outcome: Progressing

## 2025-02-12 NOTE — PT/OT/SLP EVAL
Occupational Therapy   Evaluation    Name: Rufus Preston  MRN: [id number]  Admitting Diagnosis: Closed fracture of left tibial plateau  Recent Surgery: Procedure(s) (LRB):  ORIF, FRACTURE, TIBIA, PLATEAU WITH ALLOGRAFT (Left) 1 Day Post-Op    Recommendations:     Discharge Recommendations: No Therapy Indicated  Discharge Equipment Recommendations:   (recommended a reacher)  Barriers to discharge:       Assessment:     Rufus Preston is a 59 y.o. male with a medical diagnosis of Closed fracture of left tibial plateau.  He presents with no complaints.Pt agreed to OT evaluation Performance deficits affecting function: impaired self care skills, orthopedic precautions.      Rehab Prognosis: Good; patient would benefit from acute skilled OT services to address these deficits and reach maximum level of function.       Plan:     Patient to be seen 5 x/week to address the above listed problems via self-care/home management, therapeutic activities, therapeutic exercises  Plan of Care Expires: 03/19/25  Plan of Care Reviewed with: patient, spouse    Subjective     Chief Complaint: ORIF (L) Tibia Plateau Fx  Patient/Family Comments/goals: To return home    Occupational Profile:  Living Environment: Pt lives at home with wife in 1 story home with no steps to enter  Previous level of function: I with self care prior  Roles and Routines: I with daily activities  Equipment Used at Home: crutches  Assistance upon Discharge: Wife    Pain/Comfort:  Pain Rating 1: 0/10  Pain Rating Post-Intervention 1: 0/10    Patients cultural, spiritual, Buddhism conflicts given the current situation: no    Objective:     Communicated with: LLOYD Foote prior to session.  Patient found HOB elevated with knee immobilizer, peripheral IV upon OT entry to room.    General Precautions: Standard, fall  Orthopedic Precautions: LLE toe touch weight bearing  Braces: Knee immobilizer  Respiratory Status: Room air    Occupational Performance:    Bed  Mobility:    Patient completed Rolling/Turning to Right with independence  Patient completed Supine to Sit with independence pt able to move (L) LE using hands    Functional Mobility/Transfers:  Patient completed Sit <> Stand Transfer with contact guard assistance  with  axillary crutches   Patient completed Bed <> Chair Transfer using Step Transfer technique with contact guard assistance with axillary crutches  Functional Mobility: CGA    Activities of Daily Living:  Upper Body Dressing: independence donning button down shirt  Lower Body Dressing: Pt donned pants over (L) foot using reacher, I with donning (R) foot. Pt stood with CGA to rosita over hips with cueing for TTWB on (L)      Cognitive/Visual Perceptual:  Cognitive/Psychosocial Skills:     -       Oriented to: Person, Place, and Situation   -       Follows Commands/attention:Follows one-step commands  -       Communication: clear/fluent  Visual/Perceptual:      -wears glasses. Hearing WFL      Physical Exam:  Balance:    -       I with EOB sitting, CGA with transfers  Skin integrity: Visible skin intact  Edema:  None noted  Sensation:    -       Intact  Motor Planning:    -       WFL  Dominant hand:    -       Right  Upper Extremity Range of Motion:     -       Right Upper Extremity: WFL  -       Left Upper Extremity: WFL  Upper Extremity Strength:    -       Right Upper Extremity: WFL  -       Left Upper Extremity: WFL   Strength:    -       Right Upper Extremity: WFL  -       Left Upper Extremity: WFL    AMPAC 6 Click ADL:  AMPAC Total Score: 22    Treatment & Education:  OT evaluation completed. See eval for details.Pt presents with decline in status due to (L) Tibial Plateau fx. Tx plan to focus on increasing (I) with self care and mobility.  Pt educated on OT role/POC.   Importance of OOB activity with staff assistance.  Importance of sitting up in the chair throughout the day as tolerated, especially for meals   Safety during functional t/f and  mobility with use of Crutches  Importance of assisting with self-care activities   All questions/concerns answered within OT scope of practice     Patient left up in chair with all lines intact, call button in reach, and nurse notified    GOALS:   Multidisciplinary Problems       Occupational Therapy Goals          Problem: Occupational Therapy    Goal Priority Disciplines Outcome Interventions   Occupational Therapy Goal     OT, PT/OT Progressing    Description: STG:  Pt will perform grooming (I)  Pt will bathe with min a  Pt will perform UE dressing with (I)  Pt will perform LE dressing with I/Mod I  Pt will transfer bed/chair/bsc with Mod I with crutches  Pt will perform standing task x 1 min with (S)  Pt will tolerate 20 minutes of tx without fatigue      LT.Restore to max I with self care and mobility.                          DME Justifications:      History:     History reviewed. No pertinent past medical history.      Past Surgical History:   Procedure Laterality Date    OPEN REDUCTION AND INTERNAL FIXATION (ORIF) OF FRACTURE OF TIBIAL PLATEAU Left 2025    Procedure: ORIF, FRACTURE, TIBIA, PLATEAU WITH ALLOGRAFT;  Surgeon: Marco Culver MD;  Location: [facility];  Service: Orthopedics;  Laterality: Left;       Time Tracking:     OT Date of Treatment: 25  OT Start Time: 932  OT Stop Time: 955  OT Total Time (min): 23 min    Billable Minutes:Evaluation 23    2025

## 2025-02-12 NOTE — DISCHARGE INSTRUCTIONS
Change dressing  Home Health / Swingbed nurse:  Change dressing post op day #3. Clean with Chloraprep, apply Aquacel AG dressing to incision. Apply Mepilex dressing to hemovac removal site. Contact physician if any wound drainage noted.  Continue DERECK hose on lower extremities, may remove twice day for one hour then replace. Dc staples in 2 weeks from surgery and steristrip incision. CONTINUE INCENTIVE SPIROMETER BID  *Notify your healthcare provider if you experience any of the following: temperature >100.4  *Notify your healthcare provider if you experience any of the following: redness, tenderness, or any signs or symptoms of infection (pain, swelling, redness, odor or green/yellow drainage around incision site).  *Notify your healthcare provider if you experience any of the following: difficulty breathing or increased cough.  *Notify your physician if you experience any persistent nausea, vomiting, diarrhea or headache.  *Notify your physician if you experience any of the following: severe uncontrolled pain, worsening rash, increased confusion, weakness, dizziness, lightheadedness, or visual disturbances.

## 2025-02-12 NOTE — DISCHARGE SUMMARY
"  Department of Orthopedic Surgery  Discharge Note    Admit Date: 2/11/2025  Discharge Date and Time: 2/12/2025   Attending Physician: Marco Culver MD   Discharge Provider: Katie Stephenson    Pre-op Diagnosis: Closed fracture of left tibia and fibula, initial encounter [S82.202A, S82.402A]  Closed fracture of left tibial plateau [S82.142A]  Procedure:  ORIF tibial insert with allograft by Dr. Culver  Final Diagnosis:     Disposition: Home or Self Care  Discharged Condition: good    Hospital Course:   Patient came in on 2/11/2025 and underwent ORIF tibial plateau fracture without complications. POD1 patient resting comfortably in bed without complaint this morning.  Knee immobilizer in place.  Discussed toe-touch weight bearing only.  Aspirin 81 mg for DVT prophylaxis.  Norco 10/325 as needed for pain.  Patient also needs to go home with Ace wrap and 4x4s for dressing change in 3 days.  Follow up with Dr. Culver in Orthopedic Clinic in 2 weeks.    Physical Exam:  Left lower extremity knee immobilizer clean dry and intact, surgical dressing clean dry and intact, good range of motion with dorsiflexion and plantar flexion of the foot, neurovascularly intact  Surgical incision is clean,dry,intact with minimal/expected erythema and swelling    Discharge Instructions:   Discharge Procedure Orders (must include Diet, Follow-up, Activity)   WALKER FOR HOME USE     Order Specific Question Answer Comments   Type of Walker: Adult (5'4"-6'6")    With wheels? Yes    Height: 6' 3" (1.905 m)    Weight: 128.8 kg (284 lb)    Length of need (1-99 months): 3    Does patient have medical equipment at home? crutches    Please check all that apply: Patient's condition impairs ambulation.    Please check all that apply: Patient is unable to safely ambulate without equipment.      Diet general     Keep surgical extremity elevated   Order Comments: Elevated at ankle, no pillow under knee.  Wear DERECK hose, may remove twice a day for 1 hour " then replace. Continue incentive spirometry every 2 hours while awake. Increase fluid intake. Continue Nozin nasal swab to nares twice a day until bottle is empty. Physical therapy daily x 5 days then 3 x week     Ice to affected area   Order Comments: using barrier between ice and skin (specify duration&frequency). Apply Cool Jet to operative extremity.     No driving, operating heavy equipment or signing legal documents while taking pain medication     Change dressing (specify)   Order Comments: Swingbed / Home health nurse to change dressing on post-op day #3.  Apply Aquacel AG dressing. Repeat dressing change in 7 days.  Notify physician of any wound drainage. Dc staples in 2 weeks from surgery and steristrip incision. Home health physical therapy daily x 5 days then 3 x week     Call MD for:  temperature >100.4     Call MD for:  redness, tenderness, or signs of infection (pain, swelling, redness, odor or green/yellow discharge around incision site)     Call MD for:  persistent nausea and vomiting     Call MD for:  difficulty breathing, headache or visual disturbances     Non weight bearing        Medications:     Medication List        START taking these medications      aspirin 81 MG Chew  Take 1 tablet (81 mg total) by mouth once daily.     HYDROcodone-acetaminophen  mg per tablet  Commonly known as: NORCO  Take 1 tablet by mouth every 6 (six) hours as needed for Pain.            STOP taking these medications      traMADoL 50 mg tablet  Commonly known as: ULTRAM               Where to Get Your Medications        These medications were sent to [facility]  [address] [place] [id number]      Hours: Mon-Fri, 8:30a-5:00p Phone: [phone]   HYDROcodone-acetaminophen  mg per tablet       You can get these medications from any pharmacy    You don't need a prescription for these medications  aspirin 81 MG Chew           Follow up/Patient Instructions:     Follow-up  Information       Marco Culver MD Follow up in 2 week(s).    Specialty: Orthopedic Surgery  Contact information:  [address]  [address]  Marco Culver Md, Orthopedic & Sports Medicine, [facility] [address]  [phone]

## 2025-02-12 NOTE — PLAN OF CARE
Consult after routine surgery. Tibia plateau fx post surgery. Chart review. Per PT no therapy indicated. Patient is able to walk with crutches. Dc plan home.

## 2025-02-12 NOTE — NURSING
Recvd patient from PACU status post ORIF : RE: Tibia plateau fracture , left extrem, w/ Ace wrap dsg and immobulizer. Ice pack to site pedal pulse palp and bounding.

## 2025-02-12 NOTE — TRANSFER OF CARE
"Anesthesia Transfer of Care Note    Patient: Rufus Preston    Procedure(s) Performed: Procedure(s) (LRB):  ORIF, FRACTURE, TIBIA, PLATEAU WITH ALLOGRAFT (Left)    Patient location: PACU    Anesthesia Type: general    Transport from OR: Transported from OR on 6-10 L/min O2 by face mask with adequate spontaneous ventilation    Post pain: adequate analgesia    Post assessment: no apparent anesthetic complications and tolerated procedure well    Post vital signs: stable    Level of consciousness: responds to stimulation and sedated    Nausea/Vomiting: no nausea/vomiting    Complications: none    Transfer of care protocol was followed      Last vitals: Visit Vitals  BP (!) 152/100 (BP Location: Left arm, Patient Position: Lying)   Pulse (!) 111   Temp 36.7 °C (98 °F) (Oral)   Resp 16   Ht 6' 3" (1.905 m)   Wt 128.8 kg (284 lb)   SpO2 (!) 92%   BMI 35.50 kg/m²     "

## 2025-02-12 NOTE — ANESTHESIA POSTPROCEDURE EVALUATION
Anesthesia Post Evaluation    Patient: Rufus Preston    Procedure(s) Performed: Procedure(s) (LRB):  ORIF, FRACTURE, TIBIA, PLATEAU WITH ALLOGRAFT (Left)    Final Anesthesia Type: general      Patient location during evaluation: PACU  Post-procedure vital signs: reviewed and stable  Pain management: adequate  Airway patency: patent    PONV status at discharge: No PONV  Anesthetic complications: no      Cardiovascular status: hemodynamically stable  Respiratory status: unassisted  Hydration status: euvolemic  Follow-up not needed.              Vitals Value Taken Time   /76 02/11/25 1928   Temp 36.7 °C (98 °F) 02/11/25 1824   Pulse 89 02/11/25 1928   Resp 13 02/11/25 1928   SpO2 99 % 02/11/25 1928         Event Time   Out of Recovery 19:29:07         Pain/Ramirez Score: Pain Rating Prior to Med Admin: 0 (2/11/2025  6:19 PM)  Pain Rating Post Med Admin: 0 (2/11/2025  6:19 PM)  Ramirez Score: 8 (2/11/2025  7:20 PM)

## 2025-02-12 NOTE — PT/OT/SLP PROGRESS
Physical Therapy Treatment    Patient Name:  Rufus Preston   MRN:  [id number]    Recommendations:     Discharge Recommendations: No Therapy Indicated  Discharge Equipment Recommendations: none  Barriers to discharge: None    Assessment:     Rufus Preston is a 59 y.o. male admitted with a medical diagnosis of Closed fracture of left tibial plateau.  He presents with the following impairments/functional limitations: orthopedic precautions Patient with good mobility and pain control post op. Okay for home from PT standpoint.    Rehab Prognosis: Good; patient would benefit from acute skilled PT services to address these deficits and reach maximum level of function.    Recent Surgery: Procedure(s) (LRB):  ORIF, FRACTURE, TIBIA, PLATEAU WITH ALLOGRAFT (Left) 1 Day Post-Op    Plan:     During this hospitalization, patient to be seen 1 x/week to address the identified rehab impairments via gait training and progress toward the following goals:    Plan of Care Expires:       Subjective     Chief Complaint: post op soreness  Patient/Family Comments/goals: plan is for home today  Pain/Comfort:  Pain Rating 1: 2/10  Location - Side 1: Left  Location 1: knee      Objective:     Communicated with nurse prior to session.  Patient found up in chair with   upon PT entry to room.     General Precautions: Standard, fall  Orthopedic Precautions: LLE non weight bearing  Braces: Knee immobilizer  Respiratory Status: Room air     Functional Mobility:  Transfers:     Sit to Stand:  contact guard assistance with axillary crutches  Gait: ambulated 30 feet with crutches ttwb      AM-PAC 6 CLICK MOBILITY  Turning over in bed (including adjusting bedclothes, sheets and blankets)?: 3  Sitting down on and standing up from a chair with arms (e.g., wheelchair, bedside commode, etc.): 3  Moving from lying on back to sitting on the side of the bed?: 3  Moving to and from a bed to a chair (including a wheelchair)?: 3  Need to walk in hospital room?:  3  Climbing 3-5 steps with a railing?: 3  Basic Mobility Total Score: 18       Treatment & Education:  Immobilization  Ttwb with crutches    Patient left supine with call button in reach..    GOALS:   Multidisciplinary Problems       Physical Therapy Goals       Not on file                    DME Justifications:  No DME recommended requiring DME justifications    Time Tracking:     PT Received On: 02/12/25  PT Start Time: 1030     PT Stop Time: 1045  PT Total Time (min): 15 min     Billable Minutes: Gait Training 10    Treatment Type: Treatment  PT/PTA: PT     Number of PTA visits since last PT visit: 0     02/12/2025

## 2025-02-12 NOTE — CARE UPDATE
Patient awake alert without any new complaints.  Left lower extremity moves his toes dorsiflexion plantar flexion.  Sensation intact to touch.  Palpable dorsalis pedis pulse.  No pain on passive motion.  Toe-touch weight-bearing left lower extremity.  Immobilizer left knee.  Keep it iced and elevated.  Aspirin a day for DVT prophylaxis.

## 2025-02-13 ENCOUNTER — TELEPHONE (OUTPATIENT)
Dept: ORTHOPEDICS | Facility: HOSPITAL | Age: 60
End: 2025-02-13

## 2025-02-13 NOTE — PROGRESS NOTES
[facility] - Orthopedic  Wound Care    Patient Name:  Rufus Preston   MRN:  [id number]  Date: 2/13/2025  Diagnosis: Closed fracture of left tibial plateau    History:     History reviewed. No pertinent past medical history.    Social History     Socioeconomic History    Marital status: Unknown   Tobacco Use    Smoking status: Never    Smokeless tobacco: Never   Substance and Sexual Activity    Alcohol use: Not Currently    Drug use: Not Currently    Sexual activity: Yes     Social Drivers of Health     Financial Resource Strain: Patient Declined (2/12/2025)    Overall Financial Resource Strain (CARDIA)     Difficulty of Paying Living Expenses: Patient declined   Food Insecurity: Patient Declined (2/12/2025)    Hunger Vital Sign     Worried About Running Out of Food in the Last Year: Patient declined     Ran Out of Food in the Last Year: Patient declined   Stress: Patient Declined (2/12/2025)    [employer] - Occupational Stress Questionnaire     Feeling of Stress : Patient declined   Housing Stability: Patient Declined (2/12/2025)    Housing Stability Vital Sign     Unable to Pay for Housing in the Last Year: Patient declined     Homeless in the Last Year: Patient declined       Precautions:     Allergies as of 02/03/2025    (No Known Allergies)       [facility] Assessment Details/Treatment     Narrative: Seen patient for initiation of preventative skin care measures    Patient up in chair. Alert. Family present. States skin is okay. See nursing notes for skin assessment.   Ruel score 19    D/c home today        02/13/2025

## 2025-02-13 NOTE — TELEPHONE ENCOUNTER
Spoke with daughter in law Hedy Preston rn, she stated patient doing well, no fever, no drainage at site.

## 2025-02-14 ENCOUNTER — TELEPHONE (OUTPATIENT)
Dept: ORTHOPEDICS | Facility: HOSPITAL | Age: 60
End: 2025-02-14

## 2025-02-14 NOTE — TELEPHONE ENCOUNTER
Is your pain tolerable? yes      Has Home health therapy/outpatient therapy come to see you? N/a      Are you taking your ecotrin/lovenox/eliquis? Aspirin        Have you had a bowel movement since surgery? Yes also on stool softener twice daily      Are you wearing your DERECK hose? yes      Are you doing ankle pumps?yes      Are you doing your incentive spirometry?yes      Any complaints/concerns?  none

## 2025-02-24 ENCOUNTER — HOSPITAL ENCOUNTER (OUTPATIENT)
Dept: RADIOLOGY | Facility: HOSPITAL | Age: 60
Discharge: HOME | End: 2025-02-24
Attending: ORTHOPAEDIC SURGERY

## 2025-02-24 ENCOUNTER — OFFICE VISIT (OUTPATIENT)
Dept: ORTHOPEDICS | Facility: CLINIC | Age: 60
End: 2025-02-24

## 2025-02-24 VITALS
TEMPERATURE: 98 F | SYSTOLIC BLOOD PRESSURE: 175 MMHG | HEIGHT: 75 IN | DIASTOLIC BLOOD PRESSURE: 90 MMHG | HEART RATE: 72 BPM | BODY MASS INDEX: 35.5 KG/M2 | RESPIRATION RATE: 20 BRPM | OXYGEN SATURATION: 99 %

## 2025-02-24 DIAGNOSIS — S82.142D CLOSED FRACTURE OF LEFT TIBIAL PLATEAU WITH ROUTINE HEALING, SUBSEQUENT ENCOUNTER: ICD-10-CM

## 2025-02-24 DIAGNOSIS — S82.142D CLOSED FRACTURE OF LEFT TIBIAL PLATEAU WITH ROUTINE HEALING, SUBSEQUENT ENCOUNTER: Primary | ICD-10-CM

## 2025-02-24 PROCEDURE — 73590 X-RAY EXAM OF LOWER LEG: CPT | Mod: TC,LT

## 2025-02-24 PROCEDURE — 73590 X-RAY EXAM OF LOWER LEG: CPT | Mod: 26,LT,, | Performed by: ORTHOPAEDIC SURGERY

## 2025-02-24 PROCEDURE — 99024 POSTOP FOLLOW-UP VISIT: CPT | Mod: ,,, | Performed by: ORTHOPAEDIC SURGERY

## 2025-02-24 PROCEDURE — 99213 OFFICE O/P EST LOW 20 MIN: CPT | Mod: PBBFAC,25 | Performed by: ORTHOPAEDIC SURGERY

## 2025-02-24 PROCEDURE — 99999 PR PBB SHADOW E&M-EST. PATIENT-LVL III: CPT | Mod: PBBFAC,,, | Performed by: ORTHOPAEDIC SURGERY

## 2025-02-24 NOTE — PROGRESS NOTES
Radiology Interpretation        Patient Name: Rufus Preston  Date: 2/24/2025  YOB: 1965  MRN# [id number]        ORDERING DIAGNOSIS:    Encounter Diagnosis   Name Primary?    Closed fracture of left tibial plateau with routine healing, subsequent encounter Yes      Two views AP lateral left tib-fib skeletally mature individual there is a tibial plateau fracture plate and screws in place bone graft in place no loosening of the hardware no shift from previous views impression healing fracture left tibial plateau plate and screws in place                 Marco Culver MD

## 2025-02-24 NOTE — PROGRESS NOTES
Patient is here follow-up of the ORIF in his left tibial plateau for.  Wounds are clean and dry.  Staples removed.  Steri-Strips placed.  Neurovascularly intact distally.  X-rays show no loosening of the hardware no shift alignment overall he is doing well.  Toe-touch weight-bearing only.  Not bending of the knee yet more than 30°.  I will follow back up in 4 weeks and at that time I she will be able let him start to put weight on it and work on range motion.

## 2025-03-24 ENCOUNTER — OFFICE VISIT (OUTPATIENT)
Dept: ORTHOPEDICS | Facility: CLINIC | Age: 60
End: 2025-03-24

## 2025-03-24 ENCOUNTER — HOSPITAL ENCOUNTER (OUTPATIENT)
Dept: RADIOLOGY | Facility: HOSPITAL | Age: 60
Discharge: HOME | End: 2025-03-24
Attending: ORTHOPAEDIC SURGERY

## 2025-03-24 VITALS
DIASTOLIC BLOOD PRESSURE: 106 MMHG | SYSTOLIC BLOOD PRESSURE: 160 MMHG | HEIGHT: 75 IN | OXYGEN SATURATION: 96 % | HEART RATE: 80 BPM | WEIGHT: 275 LBS | BODY MASS INDEX: 34.19 KG/M2

## 2025-03-24 DIAGNOSIS — S82.142D CLOSED FRACTURE OF LEFT TIBIAL PLATEAU WITH ROUTINE HEALING, SUBSEQUENT ENCOUNTER: ICD-10-CM

## 2025-03-24 DIAGNOSIS — S82.142D CLOSED FRACTURE OF LEFT TIBIAL PLATEAU WITH ROUTINE HEALING, SUBSEQUENT ENCOUNTER: Primary | ICD-10-CM

## 2025-03-24 PROCEDURE — 99213 OFFICE O/P EST LOW 20 MIN: CPT | Mod: PBBFAC,25 | Performed by: ORTHOPAEDIC SURGERY

## 2025-03-24 PROCEDURE — 99999 PR PBB SHADOW E&M-EST. PATIENT-LVL III: CPT | Mod: PBBFAC,,, | Performed by: ORTHOPAEDIC SURGERY

## 2025-03-24 PROCEDURE — 73560 X-RAY EXAM OF KNEE 1 OR 2: CPT | Mod: TC,LT

## 2025-03-24 PROCEDURE — 73560 X-RAY EXAM OF KNEE 1 OR 2: CPT | Mod: 26,LT,, | Performed by: ORTHOPAEDIC SURGERY

## 2025-03-24 PROCEDURE — 99024 POSTOP FOLLOW-UP VISIT: CPT | Mod: ,,, | Performed by: ORTHOPAEDIC SURGERY

## 2025-03-24 NOTE — PROGRESS NOTES
Radiology Interpretation        Patient Name: Rufus Preston  Date: 3/24/2025  YOB: 1965  MRN# [id number]        ORDERING DIAGNOSIS:    Encounter Diagnosis   Name Primary?    Closed fracture of left tibial plateau with routine healing, subsequent encounter Yes        Two views left knee skeletally mature individual there is plate and screws in place across fracture of the lateral tibial plateau was also noted to be involved of the medial tibial plateau there is some callus forming no bony lesions no loosening of the hardware impression fracture bicondylar left tibial plateau no loosening of the hardware callus forming               Marco Culver MD

## 2025-03-24 NOTE — PROGRESS NOTES
Patient is here follow-up of his left knee ORIF of the tibial plateau.  X-rays show he has healing callus starting to form.  He is 6 weeks out.  I took him out of the immobilizer.  Work on range motion of the knee.  I am going to send him to therapy.  He can start weightbear on the tibial plateau.  I will recheck him in a month with x-rays.  He was placed in the hinged knee brace to give him some support.

## 2025-03-27 ENCOUNTER — CLINICAL SUPPORT (OUTPATIENT)
Dept: REHABILITATION | Facility: HOSPITAL | Age: 60
End: 2025-03-27

## 2025-03-27 DIAGNOSIS — S82.142D CLOSED FRACTURE OF LEFT TIBIAL PLATEAU WITH ROUTINE HEALING, SUBSEQUENT ENCOUNTER: ICD-10-CM

## 2025-03-27 PROCEDURE — 97161 PT EVAL LOW COMPLEX 20 MIN: CPT

## 2025-03-27 PROCEDURE — 97110 THERAPEUTIC EXERCISES: CPT

## 2025-03-27 NOTE — PROGRESS NOTES
Outpatient Rehab    Physical Therapy Evaluation    Patient Name: Rufus Preston  MRN: [id number]  YOB: 1965  Encounter Date: 3/27/2025    Therapy Diagnosis:   Encounter Diagnosis   Name Primary?    Closed fracture of left tibial plateau with routine healing, subsequent encounter      Physician: Marco Culver MD    Physician Orders: Eval and Treat  Medical Diagnosis: Closed fracture of left tibial plateau with routine healing, subsequent encounter    Visit # / Visits Authorized:  1 / 1  Insurance Authorization Period: 3/24/2025 to 3/24/2026  Date of Evaluation: 3/27/2025  Plan of Care Certification: 3/27/2025 to 4/25/2025     Time In: 1030   Time Out: 1112  Total Time: 42   Total Billable Time:  42    Intake Outcome Measure for FOTO Survey    Therapist reviewed FOTO scores for Rufus Preston on 3/27/2025.   FOTO report - see Media section or FOTO account episode details.     Intake Score: 25%         Subjective   History of Present Illness  Rufus is a 59 y.o. male who reports to physical therapy with a chief concern of Patient has undergone repair of tibial plateau on LLE after an incident where a light pole fell and hit him. The incident happened approximately 8 weeks ago and he had surgery 7 weeks prior to this date. He has been immobilized since the time of surgery..     The patient reports a medical diagnosis of S82.142D (ICD-10-CM) - Closed fracture of left tibial plateau with routine healing, subsequent encounter. The patient has experienced this issue since 03/27/25.   Diagnostic tests related to this condition: X-ray.   X-Ray Details: 3/24/2025: Two views left knee skeletally mature individual there is plate and screws in place across fracture of the lateral tibial plateau was also noted to be involved of the medial tibial plateau there is some callus forming no bony lesions no loosening of the hardware impression fracture bicondylar left tibial plateau no loosening of the hardware callus  forming         Activities of Daily Living  Social history was obtained from Patient.               Previously independent with activities of daily living? Yes     Currently independent with activities of daily living? No  Activities currently needing assistance include Dressing - lower body and Functional mobility.        Previously independent with instrumental activities of daily living? Yes     Currently independent with instrumental activities of daily living? No  Activities currently needing assistance include: Meal prep and Driving.            Pain     Patient reports a current pain level of 1/10.  Pain at worst is reported as 3/10.   Location: Inferior patella and medial joint line  Clinical Progression (since onset): Stable  Pain Qualities: Aching  Pain-Relieving Factors: Medications - over-the-counter  Pain-Aggravating Factors: Bending, Walking, Standing           Past Medical History/Physical Systems Review:   Rufus Preston  has no past medical history on file.    Rufus Preston  has a past surgical history that includes Open reduction and internal fixation (ORIF) of fracture of tibial plateau (Left, 2/11/2025).    Rufus has a current medication list which includes the following prescription(s): aspirin.    Review of patient's allergies indicates:  No Known Allergies     Objective   Bracing   The knee brace type is Hinged brace - unlocked.            Knee Observations  Right Knee Observations  Present: Atrophy, Edema, and Incision  Not Present: Abrasion, Adhesive Scar, Deformity, Ecchymosis, Effusion, Spasms, and Trophic Changes  Right Knee Incision Observations  Present: Clean and Dry  Not Present: Drainage  Left Knee Observations  Not Present: Abrasion, Adhesive Scar, Atrophy, Deformity, Ecchymosis, Edema, Effusion, Incision, Straight Leg Raise Extensor Lag, Spasms, and Trophic Changes            Lower Extremity Sensation  General Lumbar/Lower Extremity Sensation  Intact: Right and Left  Right Lumbar/Lower  Extremity Sensation  Intact: Light Touch, Sharp/Dull Discrimination, Static Two Point Discrimination, Dynamic Two Point Discrimination, Kinesthesia, and Proprioception  Right Lumbar/Lower Extremity Sensation Stocking Glove Pattern: No    Left Lumbar/Lower Extremity Sensation  Intact: Light Touch, Static Two Point Discrimination, Dynamic Two Point Discrimination, Sharp/Dull Discrimination, Kinesthesia, and Proprioception  Left Lumbar/Lower Extremity Sensation Stocking Glove Pattern: No              Knee Swelling  Location of Measurement Right  (cm) Left  (cm)   20 cm Above Joint Line       10 cm Vastus Medialis Oblique 46 47   At Joint Line 42 43   15 cm Below Joint Line 39 41             Hip Range of Motion    Hip range of motion within functional limits bilaterally    Knee Range of Motion   Right Knee   Active (deg) Passive (deg) Pain   Flexion 125 125     Extension 2 0         Left Knee   Active (deg) Passive (deg) Pain   Flexion 68 73     Extension 7 5              Ankle/Foot Range of Motion      Patient demonstrate 5 degree deficit in RLE in comparison to LLE DF. All other motions WFL              Hip Strength - Planes of Motion   Right Strength Right Pain Left Strength Left  Pain   Flexion (L2) 4+   3+     Extension 4+   3+     ABduction 4+   3+     ADduction 4+   3+     Internal Rotation 4+   3+     External Rotation 4+   3+         Knee Strength   Right Strength Right Pain Left Strength Left  Pain   Flexion (S2) 4+   3-     Prone Flexion 4+   3-     Extension (L3) 4+   3-       Knee Extensor Lag  No Lag: Left       Ankle/Foot Strength - Planes of Motion   Right Strength Right Pain Left Strength Left  Pain   Dorsiflexion (L4) 4+   4+     Plantar Flexion (S1) 4+   4+     Inversion 4+   4+     Eversion 4+   4+     Great Toe Flexion 4+   4+     Great Toe Extension (L5) 4+   4+     Lesser Toes Flexion 4+   4+     Lesser Toes Extension 4+   4+            Knee Patellar Screening       Right Patellar  Mobility  Normal: Medial, Lateral, Superior, and Inferior  Left Patellar Mobility  Normal: Medial and Lateral  Hypomobile: Superior and Inferior              Treatment:  Therapeutic Exercise  TE 1: HEP instruction and demonstration 10 minutes      Time Entry(in minutes):  PT Evaluation (Low) Time Entry: 30  Therapeutic Exercise Time Entry: 12    Assessment & Plan   Assessment  Rufus presents with a condition of Low complexity.   Presentation of Symptoms: Stable       Functional Limitations: Activity tolerance, Bed mobility, Completing self-care activities, Completing work/school activities, Decreased ambulation distance/endurance, Maintaining balance, Increased risk of fall, Range of motion, Standing tolerance, Functional mobility  Impairments: Impaired physical strength, Lack of appropriate home exercise program, Pain with functional activity  Personal Factors Affecting Prognosis: Pain    Prognosis: Excellent    Plan  From a physical therapy perspective, the patient would benefit from: Skilled Rehab Services    Planned therapy interventions include: Therapeutic exercise, Therapeutic activities, Manual therapy, Gait training, and Neuromuscular re-education.    Planned modalities to include: Electrical stimulation - attended and Cryotherapy (cold pack).        Visit Frequency: 3 times Per Week for 4 Weeks.       This plan was discussed with Patient.   Discussion participants: Agreed Upon Plan of Care             Patient's spiritual, cultural, and educational needs considered and patient agreeable to plan of care and goals.           Goals:   Active       Long Term Goals       Patient will ambulate for community level distances with use of SPC without significant increase in pain in LLE       Start:  03/27/25    Expected End:  04/25/25            Patient will demonstrate active range of motion of LLE knee to 0-125 demonstrating improved range of motion and with less pain       Start:  03/27/25    Expected End:   04/25/25            Patient will increase overall FOTO score by 14 points or more.       Start:  03/27/25    Expected End:  04/25/25               Short Term Goals       Patient will increase passive range of motion of left knee to 0-90 without increased pain       Start:  03/27/25    Expected End:  04/11/25            Patient will increase overall LLE strength to 4+/5       Start:  03/27/25    Expected End:  04/11/25            Patient will increase active DF on LLE to 25 degrees for safety during ambulation       Start:  03/27/25    Expected End:  04/11/25                Deshawn Gutierrez, PT

## 2025-03-31 ENCOUNTER — CLINICAL SUPPORT (OUTPATIENT)
Dept: REHABILITATION | Facility: HOSPITAL | Age: 60
End: 2025-03-31

## 2025-03-31 DIAGNOSIS — S82.142D CLOSED FRACTURE OF LEFT TIBIAL PLATEAU WITH ROUTINE HEALING, SUBSEQUENT ENCOUNTER: Primary | ICD-10-CM

## 2025-03-31 PROCEDURE — 97140 MANUAL THERAPY 1/> REGIONS: CPT | Mod: CQ

## 2025-03-31 PROCEDURE — 97112 NEUROMUSCULAR REEDUCATION: CPT | Mod: CQ

## 2025-03-31 PROCEDURE — 97110 THERAPEUTIC EXERCISES: CPT | Mod: CQ

## 2025-03-31 NOTE — PROGRESS NOTES
Outpatient Rehab    Physical Therapy Visit    Patient Name: Rufus Preston  MRN: [id number]  YOB: 1965  Encounter Date: 3/31/2025    Therapy Diagnosis:   Encounter Diagnosis   Name Primary?    Closed fracture of left tibial plateau with routine healing, subsequent encounter Yes     Physician: Marco Culver MD    Physician Orders: Eval and Treat  Medical Diagnosis: Closed fracture of left tibial plateau with routine healing, subsequent encounter    Visit # / Visits Authorized:  1 / 12  Insurance Authorization Period: 3/27/2025 to 3/25/2027  Date of Evaluation: 3/27/2025  Plan of Care Certification: 3/27/2025 to 4/25/2025      PT/PTA:   PTA  Number of PTA visits since last PT visit: 1/5  Time In: 0945   Time Out: 1030  Total Time: 45   Total Billable Time: 45    FOTO:  Intake Score:  %  Survey Score 1:  %  Survey Score 2:  %         Subjective   Pt. states he's very stiff today..         Objective            Treatment:  Therapeutic Exercise  TE 1: SLR x 25 on Left  TE 2: SAQ over bolster x 20  TE 3: Heel slide with assist to MRE x 20  TE 4: Sidelying Hip ADD x 20  TE 5: Prone HS Curl x 20  TE 6: Prone Hip Ext x 20  Manual Therapy  MT 1: x 4 mins  MT 2: x 4 mins  Balance/Neuromuscular Re-Education  NMR 1: Standing wt. shifts in parallel bars lateral and ant/ posterior x 12 reps each with wide and narrow based stance, with left foot and right foot forward x 12 reps each with wide base with bilateral HHA on parallel bars.       Assessment & Plan   Assessment: Pt. with knee flexion gains to 83 degrees active and 87 degrees passive flexion  Evaluation/Treatment Tolerance: Patient tolerated treatment well    Patient will continue to benefit from skilled outpatient physical therapy to address the deficits listed in the problem list box on initial evaluation, provide pt/family education and to maximize pt's level of independence in the home and community environment.     Patient's spiritual, cultural, and  educational needs considered and patient agreeable to plan of care and goals.           Plan:  Will cont. Per MD orders.     Goals:   Active       Long Term Goals       Patient will ambulate for community level distances with use of SPC without significant increase in pain in LLE       Start:  03/27/25    Expected End:  04/25/25            Patient will demonstrate active range of motion of LLE knee to 0-125 demonstrating improved range of motion and with less pain       Start:  03/27/25    Expected End:  04/25/25            Patient will increase overall FOTO score by 14 points or more.       Start:  03/27/25    Expected End:  04/25/25               Short Term Goals       Patient will increase passive range of motion of left knee to 0-90 without increased pain       Start:  03/27/25    Expected End:  04/11/25            Patient will increase overall LLE strength to 4+/5       Start:  03/27/25    Expected End:  04/11/25            Patient will increase active DF on LLE to 25 degrees for safety during ambulation       Start:  03/27/25    Expected End:  04/11/25                Yissel Leblanc, PTA

## 2025-04-02 ENCOUNTER — CLINICAL SUPPORT (OUTPATIENT)
Dept: REHABILITATION | Facility: HOSPITAL | Age: 60
End: 2025-04-02

## 2025-04-02 DIAGNOSIS — S82.142D CLOSED FRACTURE OF LEFT TIBIAL PLATEAU WITH ROUTINE HEALING, SUBSEQUENT ENCOUNTER: Primary | ICD-10-CM

## 2025-04-02 PROCEDURE — 97110 THERAPEUTIC EXERCISES: CPT

## 2025-04-02 NOTE — PROGRESS NOTES
Outpatient Rehab    Physical Therapy Visit    Patient Name: Rufus Preston  MRN: [id number]  YOB: 1965  Encounter Date: 4/2/2025    Therapy Diagnosis:   Encounter Diagnosis   Name Primary?    Closed fracture of left tibial plateau with routine healing, subsequent encounter Yes     Physician: Marco Culver MD    Physician Orders: Eval and Treat  Medical Diagnosis: Closed fracture of left tibial plateau with routine healing, subsequent encounter    Visit # / Visits Authorized:  2 / 12  Insurance Authorization Period: 3/27/2025 to 3/25/2027  Date of Evaluation: 3/27/2024  Plan of Care Certification: 3/27/2025 to 4/25/2025     PT/PTA: PT   Number of PTA visits since last PT visit:0  Time In: 0925   Time Out: 1010  Total Time: 45   Total Billable Time:      FOTO:  Intake Score:  %  Survey Score 1:  %  Survey Score 2:  %         Subjective   Patient notes mild pain in left knee upon arrival..  Pain reported as 2/10.      Objective       Knee Range of Motion   Right Knee   Active (deg) Passive (deg) Pain   Flexion 125 125     Extension 2 0         Left Knee   Active (deg) Passive (deg) Pain   Flexion 84 100     Extension 3 3                     Treatment:  Therapeutic Exercise  TE 1: Nu-step x 6 mins  TE 2: Standing Heel raises  TE 3: LAQ x30 4#  TE 4: SLR x30 (15 straight/ 15 VMO focused)  TE 5: Hooklying Hip Abduction x30 BTB  TE 6: Recumbent Bike x 5 mins  TE 7: Wall Slides for Knee Flexion x15      Time Entry(in minutes):  Therapeutic Exercise Time Entry: 45    Assessment & Plan   Assessment: Patient demonstrates increased in knee flexion ROM to 100 degrees  Evaluation/Treatment Tolerance: Patient tolerated treatment well    Patient will continue to benefit from skilled outpatient physical therapy to address the deficits listed in the problem list box on initial evaluation, provide pt/family education and to maximize pt's level of independence in the home and community environment.     Patient's  spiritual, cultural, and educational needs considered and patient agreeable to plan of care and goals.           Plan: Continue with POC as indicated    Goals:   Active       Long Term Goals       Patient will ambulate for community level distances with use of SPC without significant increase in pain in LLE       Start:  03/27/25    Expected End:  04/25/25            Patient will demonstrate active range of motion of LLE knee to 0-125 demonstrating improved range of motion and with less pain       Start:  03/27/25    Expected End:  04/25/25            Patient will increase overall FOTO score by 14 points or more.       Start:  03/27/25    Expected End:  04/25/25               Short Term Goals       Patient will increase passive range of motion of left knee to 0-90 without increased pain       Start:  03/27/25    Expected End:  04/11/25            Patient will increase overall LLE strength to 4+/5       Start:  03/27/25    Expected End:  04/11/25            Patient will increase active DF on LLE to 25 degrees for safety during ambulation       Start:  03/27/25    Expected End:  04/11/25                Dehsawn Gutierrez, PT

## 2025-04-04 ENCOUNTER — CLINICAL SUPPORT (OUTPATIENT)
Dept: REHABILITATION | Facility: HOSPITAL | Age: 60
End: 2025-04-04

## 2025-04-04 DIAGNOSIS — S82.142D CLOSED FRACTURE OF LEFT TIBIAL PLATEAU WITH ROUTINE HEALING, SUBSEQUENT ENCOUNTER: Primary | ICD-10-CM

## 2025-04-04 PROCEDURE — 97110 THERAPEUTIC EXERCISES: CPT | Mod: CQ

## 2025-04-04 PROCEDURE — 97530 THERAPEUTIC ACTIVITIES: CPT | Mod: CQ

## 2025-04-04 NOTE — PROGRESS NOTES
"  Outpatient Rehab    Physical Therapy Visit    Patient Name: Rufus Preston  MRN: [id number]  YOB: 1965  Encounter Date: 4/4/2025    Therapy Diagnosis:   Encounter Diagnosis   Name Primary?    Closed fracture of left tibial plateau with routine healing, subsequent encounter Yes     Physician: Marco Culver MD    Physician Orders: Eval and Treat  Medical Diagnosis: Closed fracture of left tibial plateau with routine healing, subsequent encounter    Visit # / Visits Authorized:  3 / 12  Insurance Authorization Period: 3/27/2025 to 3/25/2027  Date of Evaluation: 3/27/2025  Plan of Care Certification:  3/27/2025 to 4/25/2025      PT/PTA: PTA   Number of PTA visits since last PT visit:1  Time In: 0935   Time Out:  1025  Total Time:   50  Total Billable Time:  45    FOTO:  Intake Score:  %  Survey Score 1:  %  Survey Score 2:  %         Subjective   Pt. states "I dont have no pain" Except with bending..    Pt. reports pain/ stiffness with wall slides    Objective            Treatment:  Therapeutic Exercise  TE 1: Nu-step x 8 mins  TE 2: Standing Heel raises 2 x 20  TE 3: LAQ x30 4#  TE 4: SLR x30 (15 straight/ 15 VMO focused)  TE 5: Hooklying Hip Abduction x30 BTB  TE 6: Recumbent Bike x 8 mins with Max flexion to 105 degrees on Left      Time Entry(in minutes):       Assessment & Plan   Assessment:         Patient will continue to benefit from skilled outpatient physical therapy to address the deficits listed in the problem list box on initial evaluation, provide pt/family education and to maximize pt's level of independence in the home and community environment.     Patient's spiritual, cultural, and educational needs considered and patient agreeable to plan of care and goals.           Plan:      Goals:   Active       Long Term Goals       Patient will ambulate for community level distances with use of SPC without significant increase in pain in LLE       Start:  03/27/25    Expected End:  04/25/25       "      Patient will demonstrate active range of motion of LLE knee to 0-125 demonstrating improved range of motion and with less pain       Start:  03/27/25    Expected End:  04/25/25            Patient will increase overall FOTO score by 14 points or more.       Start:  03/27/25    Expected End:  04/25/25               Short Term Goals       Patient will increase passive range of motion of left knee to 0-90 without increased pain       Start:  03/27/25    Expected End:  04/11/25            Patient will increase overall LLE strength to 4+/5       Start:  03/27/25    Expected End:  04/11/25            Patient will increase active DF on LLE to 25 degrees for safety during ambulation       Start:  03/27/25    Expected End:  04/11/25                Yissel Leblanc, PTA

## 2025-04-07 ENCOUNTER — CLINICAL SUPPORT (OUTPATIENT)
Dept: REHABILITATION | Facility: HOSPITAL | Age: 60
End: 2025-04-07

## 2025-04-07 DIAGNOSIS — S82.142D CLOSED FRACTURE OF LEFT TIBIAL PLATEAU WITH ROUTINE HEALING, SUBSEQUENT ENCOUNTER: Primary | ICD-10-CM

## 2025-04-07 PROCEDURE — 97110 THERAPEUTIC EXERCISES: CPT | Mod: CQ

## 2025-04-07 PROCEDURE — 97530 THERAPEUTIC ACTIVITIES: CPT | Mod: CQ

## 2025-04-07 NOTE — PROGRESS NOTES
Outpatient Rehab    Physical Therapy Visit    Patient Name: Rufus Preston  MRN: [id number]  YOB: 1965  Encounter Date: 4/7/2025    Therapy Diagnosis:   Encounter Diagnosis   Name Primary?    Closed fracture of left tibial plateau with routine healing, subsequent encounter Yes     Physician: Marco Culver MD    Physician Orders: Eval and Treat  Medical Diagnosis: Closed fracture of left tibial plateau with routine healing, subsequent encounter    Visit # / Visits Authorized:  4 / 12  Insurance Authorization Period: 3/27/2025 to 3/25/2027  Date of Evaluation: 3/27/2025  Plan of Care Certification: 3/27/2025 to 4/25/2025      PT/PTA:   PTA  Number of PTA visits since last PT visit: 2/5  Time In: 0928   Time Out: 1018  Total Time: 50   Total Billable Time: 45    FOTO:  Intake Score:  %  Survey Score 1:  %  Survey Score 2:  %         Subjective   Pt. with no new complaints. Reports continued stiffness today..         Objective            Treatment:  Therapeutic Exercise  TE 1: Nu-step x 7 mins  TE 2: Standing Heel raises 2 x 20  TE 3: LAQ x30 4#  TE 4: SLR x30 (15 straight/ 15 VMO focused) with 2#cw  TE 5: Hooklying Hip Abduction x20 BTB  TE 6: Recumbent Bike x 8 mins  TE 7: Wall Slides for Knee Flexion x15  TE 8: Sidelying Hip ABD with 2#cw 2 x 15  TE 9: Sidelying Hip ADD 2 x 15  TE 10: Prone Hip Extension  Therapeutic Activity  TA 1: Slant Stretch 2 x 30 seconds    Time Entry(in minutes):       Assessment & Plan   Assessment: Patient demonstrates increased in knee flexion ROM to 104 degrees       Patient will continue to benefit from skilled outpatient physical therapy to address the deficits listed in the problem list box on initial evaluation, provide pt/family education and to maximize pt's level of independence in the home and community environment.     Patient's spiritual, cultural, and educational needs considered and patient agreeable to plan of care and goals.           Plan:  Will cont per PT  POC     Goals:   Active       Long Term Goals       Patient will ambulate for community level distances with use of SPC without significant increase in pain in LLE       Start:  03/27/25    Expected End:  04/25/25            Patient will demonstrate active range of motion of LLE knee to 0-125 demonstrating improved range of motion and with less pain       Start:  03/27/25    Expected End:  04/25/25            Patient will increase overall FOTO score by 14 points or more.       Start:  03/27/25    Expected End:  04/25/25               Short Term Goals       Patient will increase passive range of motion of left knee to 0-90 without increased pain       Start:  03/27/25    Expected End:  04/11/25            Patient will increase overall LLE strength to 4+/5       Start:  03/27/25    Expected End:  04/11/25            Patient will increase active DF on LLE to 25 degrees for safety during ambulation       Start:  03/27/25    Expected End:  04/11/25                Yissel Leblanc, PTA

## 2025-04-09 ENCOUNTER — CLINICAL SUPPORT (OUTPATIENT)
Dept: REHABILITATION | Facility: HOSPITAL | Age: 60
End: 2025-04-09

## 2025-04-09 DIAGNOSIS — S82.142D CLOSED FRACTURE OF LEFT TIBIAL PLATEAU WITH ROUTINE HEALING, SUBSEQUENT ENCOUNTER: Primary | ICD-10-CM

## 2025-04-09 PROCEDURE — 97110 THERAPEUTIC EXERCISES: CPT

## 2025-04-09 PROCEDURE — 97530 THERAPEUTIC ACTIVITIES: CPT

## 2025-04-09 NOTE — PROGRESS NOTES
Outpatient Rehab    Physical Therapy Visit    Patient Name: Rufus Preston  MRN: [id number]  YOB: 1965  Encounter Date: 4/9/2025    Therapy Diagnosis:   Encounter Diagnosis   Name Primary?    Closed fracture of left tibial plateau with routine healing, subsequent encounter Yes     Physician: Marco Culver MD    Physician Orders: Eval and Treat  Medical Diagnosis: Closed fracture of left tibial plateau with routine healing, subsequent encounter    Visit # / Visits Authorized:  5 / 12  Insurance Authorization Period: 3/27/2025 to 3/25/2027  Date of Evaluation: 3/27/2025  Plan of Care Certification: 3/27/2025 to 4/25/2025      PT/PTA: PT   Number of PTA visits since last PT visit:0  Time In: 0930   Time Out: 1015  Total Time: 45   Total Billable Time:  45           Subjective   Pt. with no new complaints..  Pain reported as 1/10.      Objective       Knee Range of Motion   Right Knee   Active (deg) Passive (deg) Pain   Flexion 125 125     Extension 2 0         Left Knee   Active (deg) Passive (deg) Pain   Flexion 105 109     Extension                         Treatment:  Therapeutic Exercise  TE 1: Recumbent Bike x10 mins  TE 2: Slant Board Stretch 4x30 secs  TE 3: LAQ x30 4#  TE 5: Hip ER 90/90 RTB x30  TE 6: HIp IR 90/90 RTB x30  TE 7: Wall Slides for Knee Flexion x15  TE 8: Standing Hip 4 way GTB  Therapeutic Activity  TA 1: Slant Stretch 2 x 30 seconds  TA 2: Hamstring Stretch 4x30 secs    Time Entry(in minutes):  Therapeutic Activity Time Entry: 10  Therapeutic Exercise Time Entry: 35    Assessment & Plan   Assessment: Patient is progressing with skilled PT intervention  Evaluation/Treatment Tolerance: Patient tolerated treatment well    Patient will continue to benefit from skilled outpatient physical therapy to address the deficits listed in the problem list box on initial evaluation, provide pt/family education and to maximize pt's level of independence in the home and community environment.      Patient's spiritual, cultural, and educational needs considered and patient agreeable to plan of care and goals.           Plan: Continue with POC as indicated    Goals:   Active       Long Term Goals       Patient will ambulate for community level distances with use of SPC without significant increase in pain in LLE (Progressing)       Start:  03/27/25    Expected End:  04/25/25            Patient will demonstrate active range of motion of LLE knee to 0-125 demonstrating improved range of motion and with less pain (Progressing)       Start:  03/27/25    Expected End:  04/25/25            Patient will increase overall FOTO score by 14 points or more. (Progressing)       Start:  03/27/25    Expected End:  04/25/25               Short Term Goals       Patient will increase passive range of motion of left knee to 0-90 without increased pain (Met)       Start:  03/27/25    Expected End:  04/11/25    Resolved:  04/09/25         Patient will increase overall LLE strength to 4+/5 (Progressing)       Start:  03/27/25    Expected End:  04/11/25            Patient will increase active DF on LLE to 25 degrees for safety during ambulation (Met)       Start:  03/27/25    Expected End:  04/11/25    Resolved:  04/09/25             Deshawn Gutierrez PT

## 2025-04-11 ENCOUNTER — CLINICAL SUPPORT (OUTPATIENT)
Dept: REHABILITATION | Facility: HOSPITAL | Age: 60
End: 2025-04-11

## 2025-04-11 DIAGNOSIS — S82.142D CLOSED FRACTURE OF LEFT TIBIAL PLATEAU WITH ROUTINE HEALING, SUBSEQUENT ENCOUNTER: Primary | ICD-10-CM

## 2025-04-11 PROCEDURE — 97110 THERAPEUTIC EXERCISES: CPT | Mod: CQ

## 2025-04-11 PROCEDURE — 97530 THERAPEUTIC ACTIVITIES: CPT | Mod: CQ

## 2025-04-11 NOTE — PROGRESS NOTES
Outpatient Rehab    Physical Therapy Visit    Patient Name: Rufus Preston  MRN: [id number]  YOB: 1965  Encounter Date: 4/11/2025    Therapy Diagnosis:   Encounter Diagnosis   Name Primary?    Closed fracture of left tibial plateau with routine healing, subsequent encounter Yes     Physician: Marco Culver MD    Physician Orders: Eval and Treat  Medical Diagnosis: Closed fracture of left tibial plateau with routine healing, subsequent encounter    Visit # / Visits Authorized:  6 / 12  Insurance Authorization Period: 3/27/2025 to 3/25/2027  Date of Evaluation: 3/27/2025  Plan of Care Certification: 3/27/2025 to 4/25/2025      PT/PTA: PTA   Number of PTA visits since last PT visit:1  Time In: 0930   Time Out: 1017   Total Time:   47  Total Billable Time:  45    FOTO:  Intake Score:  %  Survey Score 1:  %  Survey Score 2:  %         Subjective   Pt. states he did his exercises last night with 4# cw @ home and got sore..    States his only pain is with bending.    Objective            Treatment:  Therapeutic Exercise  TE 1: Recumbent Bike x10 mins up to Level 2 resistance  TE 2: Heel slides x 10 with Ankle DF @ end range x 20 reps  TE 3: LAQ x30 4#  TE 4: SLR x30 (15 straight/ 15 VMO focused) with 2#cw  TE 5: Hip ER 90/90 RTB x30  TE 6: HIp IR 90/90 RTB x30  TE 9: Sidelying Hip ADD x 15  Therapeutic Activity  TA 1: Slant Stretch 2 x 30 seconds  TA 2: Hamstring Stretch 4x30 secs    Time Entry(in minutes):   TE: 35 mins  TA: 12 mins    Assessment & Plan   Assessment: Patient is progressing toward goals with skilled PT intervention  Evaluation/Treatment Tolerance: Patient tolerated treatment well    Patient will continue to benefit from skilled outpatient physical therapy to address the deficits listed in the problem list box on initial evaluation, provide pt/family education and to maximize pt's level of independence in the home and community environment.     Patient's spiritual, cultural, and educational  needs considered and patient agreeable to plan of care and goals.           Plan: Continue with POC as indicated    Goals:   Active       Long Term Goals       Patient will ambulate for community level distances with use of SPC without significant increase in pain in LLE (Progressing)       Start:  03/27/25    Expected End:  04/25/25            Patient will demonstrate active range of motion of LLE knee to 0-125 demonstrating improved range of motion and with less pain (Progressing)       Start:  03/27/25    Expected End:  04/25/25            Patient will increase overall FOTO score by 14 points or more. (Progressing)       Start:  03/27/25    Expected End:  04/25/25               Short Term Goals       Patient will increase passive range of motion of left knee to 0-90 without increased pain (Met)       Start:  03/27/25    Expected End:  04/11/25    Resolved:  04/09/25         Patient will increase overall LLE strength to 4+/5 (Progressing)       Start:  03/27/25    Expected End:  04/11/25            Patient will increase active DF on LLE to 25 degrees for safety during ambulation (Met)       Start:  03/27/25    Expected End:  04/11/25    Resolved:  04/09/25             Yissel Leblanc, PTA

## 2025-04-14 ENCOUNTER — CLINICAL SUPPORT (OUTPATIENT)
Dept: REHABILITATION | Facility: HOSPITAL | Age: 60
End: 2025-04-14

## 2025-04-14 DIAGNOSIS — S82.142D CLOSED FRACTURE OF LEFT TIBIAL PLATEAU WITH ROUTINE HEALING, SUBSEQUENT ENCOUNTER: Primary | ICD-10-CM

## 2025-04-14 PROCEDURE — 97110 THERAPEUTIC EXERCISES: CPT | Mod: CQ

## 2025-04-14 PROCEDURE — 97530 THERAPEUTIC ACTIVITIES: CPT | Mod: CQ

## 2025-04-14 NOTE — PROGRESS NOTES
Outpatient Rehab    Physical Therapy Visit    Patient Name: Rufus Preston  MRN: [id number]  YOB: 1965  Encounter Date: 4/14/2025    Therapy Diagnosis:   Encounter Diagnosis   Name Primary?    Closed fracture of left tibial plateau with routine healing, subsequent encounter Yes     Physician: Marco Culver MD    Physician Orders: Eval and Treat  Medical Diagnosis: Closed fracture of left tibial plateau with routine healing, subsequent encounter    Visit # / Visits Authorized:  7 / 12  Insurance Authorization Period: 3/27/2025 to 3/25/2027  Date of Evaluation: 3/27/2025  Plan of Care Certification: 3/27/2025 to 4/25/2025      PT/PTA: PTA   Number of PTA visits since last PT visit:2/5  Time In:   9:28  Time Out:  10:15  Total Time:   47  Total Billable Time:  45    FOTO:  Intake Score:  %  Survey Score 1:  %  Survey Score 2:  %         Subjective   Pt. with no new complaints, states he feels knee has been bending well..  Pain reported as 0/10. Pt. reports decreased pain with flexion activities.    Objective            Treatment:  Therapeutic Exercise  TE 1: Recumbent Bike x10 mins up to Level 2 resistance  TE 2: Heel slides x 10 with Ankle DF @ end range x 20 reps  TE 3: LAQ x30 4#  TE 4: SLR x30 (15 straight/ 15 VMO focused) with 3#cw  TE 5: Hip ER 90/90 RTB x30  TE 6: HIp IR 90/90 RTB x30  TE 7: Wall Slides for Knee Flexion x15  TE 8: Standing Hip 4 way GRN TB x 15 reps each  TE 9: Sidelying Hip ADD x 15reps with 2# cw on Left  Therapeutic Activity  TA 1: Slant Stretch 2 x 30 seconds  TA 2: Hamstring Stretch 4x30 secs    Time Entry(in minutes):       Assessment & Plan   Assessment: Pt. displays 111 degrees active knee flexion and 115 degrees passive knee flexion on Left side.  Evaluation/Treatment Tolerance: Patient tolerated treatment well    Patient will continue to benefit from skilled outpatient physical therapy to address the deficits listed in the problem list box on initial evaluation, provide  pt/family education and to maximize pt's level of independence in the home and community environment.     Patient's spiritual, cultural, and educational needs considered and patient agreeable to plan of care and goals.           Plan:  Cont. Progressing pt. Per current POC.    Goals:   Active       Long Term Goals       Patient will ambulate for community level distances with use of SPC without significant increase in pain in LLE (Progressing)       Start:  03/27/25    Expected End:  04/25/25            Patient will demonstrate active range of motion of LLE knee to 0-125 demonstrating improved range of motion and with less pain (Progressing)       Start:  03/27/25    Expected End:  04/25/25            Patient will increase overall FOTO score by 14 points or more. (Progressing)       Start:  03/27/25    Expected End:  04/25/25               Short Term Goals       Patient will increase passive range of motion of left knee to 0-90 without increased pain (Met)       Start:  03/27/25    Expected End:  04/11/25    Resolved:  04/09/25         Patient will increase overall LLE strength to 4+/5 (Progressing)       Start:  03/27/25    Expected End:  04/11/25            Patient will increase active DF on LLE to 25 degrees for safety during ambulation (Met)       Start:  03/27/25    Expected End:  04/11/25    Resolved:  04/09/25             Yissel Leblanc, PTA

## 2025-04-16 ENCOUNTER — CLINICAL SUPPORT (OUTPATIENT)
Dept: REHABILITATION | Facility: HOSPITAL | Age: 60
End: 2025-04-16

## 2025-04-16 DIAGNOSIS — S82.142D CLOSED FRACTURE OF LEFT TIBIAL PLATEAU WITH ROUTINE HEALING, SUBSEQUENT ENCOUNTER: Primary | ICD-10-CM

## 2025-04-16 PROCEDURE — 97530 THERAPEUTIC ACTIVITIES: CPT | Mod: CQ

## 2025-04-16 PROCEDURE — 97110 THERAPEUTIC EXERCISES: CPT | Mod: CQ

## 2025-04-16 NOTE — PROGRESS NOTES
Outpatient Rehab    Physical Therapy Visit    Patient Name: Rufus Preston  MRN: [id number]  YOB: 1965  Encounter Date: 4/16/2025    Therapy Diagnosis: No diagnosis found.  Physician: Marco Culver MD    Physician Orders: Eval and Treat  Medical Diagnosis: Closed fracture of left tibial plateau with routine healing, subsequent encounter    Visit # / Visits Authorized:  8 / 12  Insurance Authorization Period: 3/27/2025 to 3/25/2027  Date of Evaluation: 3/27/2025  Plan of Care Certification: 3/27/2025 to 4/25/2025      PT/PTA:   PTA  Number of PTA visits since last PT visit: 2  Time In: 0922   Time Out: 1019  Total Time: 57       FOTO:  Intake Score:  %  Survey Score 1:  %  Survey Score 2:  %         Subjective   Pt. with continued reports of stiffness..  Pain reported as 2/10.      Objective            Treatment:  Therapeutic Exercise  TE 1: Recumbent Bike x8 mins up to Level 2 resistance  TE 2: Heel slides 3 x 10 with Ankle DF @ end range 3 x 10 reps  TE 3: LAQ x30 4#  TE 4: SLR x30 (15 straight/ 15 VMO focused) with 3#cw  TE 5: Hip ER 90/90 GRN TB x30  TE 6: HIp IR 90/90 GRN TB x30  TE 7: Wall Slides for Knee Flexion x 25 reps with knee flexion stretches at endrange  TE 8: Standing Hip 4 way GRN TB 2 x 15 reps each  TE 9: Sidelying Hip ADD 2 x 15reps with 2# cw on Left  Therapeutic Activity  TA 1: Slant Stretch 3 x 1 min  TA 2: Hamstring Stretch 4x30 secs      Assessment & Plan   Assessment: Pt.  Evaluation/Treatment Tolerance: Patient tolerated treatment well    Patient will continue to benefit from skilled outpatient physical therapy to address the deficits listed in the problem list box on initial evaluation, provide pt/family education and to maximize pt's level of independence in the home and community environment.     Patient's spiritual, cultural, and educational needs considered and patient agreeable to plan of care and goals.           Plan:  Will cont. PT POC progressing as pt. Tolerates.      Goals:   Active       Long Term Goals       Patient will ambulate for community level distances with use of SPC without significant increase in pain in LLE (Progressing)       Start:  03/27/25    Expected End:  04/25/25            Patient will demonstrate active range of motion of LLE knee to 0-125 demonstrating improved range of motion and with less pain (Progressing)       Start:  03/27/25    Expected End:  04/25/25            Patient will increase overall FOTO score by 14 points or more. (Progressing)       Start:  03/27/25    Expected End:  04/25/25               Short Term Goals       Patient will increase passive range of motion of left knee to 0-90 without increased pain (Met)       Start:  03/27/25    Expected End:  04/11/25    Resolved:  04/09/25         Patient will increase overall LLE strength to 4+/5 (Progressing)       Start:  03/27/25    Expected End:  04/11/25            Patient will increase active DF on LLE to 25 degrees for safety during ambulation (Met)       Start:  03/27/25    Expected End:  04/11/25    Resolved:  04/09/25             Yissel Leblanc, PTA

## 2025-04-17 ENCOUNTER — CLINICAL SUPPORT (OUTPATIENT)
Dept: REHABILITATION | Facility: HOSPITAL | Age: 60
End: 2025-04-17

## 2025-04-17 DIAGNOSIS — S82.142D CLOSED FRACTURE OF LEFT TIBIAL PLATEAU WITH ROUTINE HEALING, SUBSEQUENT ENCOUNTER: Primary | ICD-10-CM

## 2025-04-17 PROCEDURE — 97110 THERAPEUTIC EXERCISES: CPT | Mod: CQ

## 2025-04-17 PROCEDURE — 97530 THERAPEUTIC ACTIVITIES: CPT | Mod: CQ

## 2025-04-17 NOTE — PROGRESS NOTES
Outpatient Rehab    Physical Therapy Visit    Patient Name: Rufus Preston  MRN: [id number]  YOB: 1965  Encounter Date: 4/17/2025    Therapy Diagnosis:   Encounter Diagnosis   Name Primary?    Closed fracture of left tibial plateau with routine healing, subsequent encounter Yes     Physician: Marco Culver MD    Physician Orders: Eval and Treat  Medical Diagnosis: Closed fracture of left tibial plateau with routine healing, subsequent encounter    Visit # / Visits Authorized:  9 / 12  Insurance Authorization Period: 3/27/2025 to 3/25/2027  Date of Evaluation: 3/27/2025  Plan of Care Certification: 3/27/2025 to 4/25/2025      PT/PTA: PTA   Number of PTA visits since last PT visit:3  Time In: 0930   Time Out: 1015  Total Time: 45   Total Billable Time: 45    FOTO:  Intake Score:  %  Survey Score 1:  %  Survey Score 2:  %         Subjective   Pt. with no new complaints this AM..         Objective            Treatment:  Therapeutic Exercise  TE 1: Recumbent Bike x11 mins up to Level 6 resistance  TE 3: LAQ x30 4#  TE 4: SLR x30 (15 straight/ 15 VMO focused) with 3#cw  TE 5: Hip ER 90/90 GRN TB x10  TE 6: HIp IR 90/90 GRN TB x10  TE 7: Wall Slides for Knee Flexion x 25 reps with knee flexion stretches at endrange  TE 9: Sidelying Hip ADD 2 x 10 reps with 3# cw on Left  TE 10: Prone Hip Extension with 2# 3 x 10 reps  Therapeutic Activity  TA 1: Slant Stretch 3 x 30 sec  TA 2: Hamstring Stretch 3x30 secs  TA 3: FWD Step- Ups on 4 inch step x 10 reps  TA 4: Lateral Step- Ups on 4 inch step x 10 reps    Time Entry(in minutes):   TE: 30   TA: 15    Assessment & Plan   Assessment: Pt. with 121 degrees of passive knee flexion on Left this visit after stretches.  Evaluation/Treatment Tolerance: Patient tolerated treatment well    Patient will continue to benefit from skilled outpatient physical therapy to address the deficits listed in the problem list box on initial evaluation, provide pt/family education and to  maximize pt's level of independence in the home and community environment.     Patient's spiritual, cultural, and educational needs considered and patient agreeable to plan of care and goals.           Plan:  Will cot. Progressing towards current PT goals.    Goals:   Active       Long Term Goals       Patient will ambulate for community level distances with use of SPC without significant increase in pain in LLE (Progressing)       Start:  03/27/25    Expected End:  04/25/25            Patient will demonstrate active range of motion of LLE knee to 0-125 demonstrating improved range of motion and with less pain (Progressing)       Start:  03/27/25    Expected End:  04/25/25            Patient will increase overall FOTO score by 14 points or more. (Progressing)       Start:  03/27/25    Expected End:  04/25/25               Short Term Goals       Patient will increase passive range of motion of left knee to 0-90 without increased pain (Met)       Start:  03/27/25    Expected End:  04/11/25    Resolved:  04/09/25         Patient will increase overall LLE strength to 4+/5 (Progressing)       Start:  03/27/25    Expected End:  04/11/25            Patient will increase active DF on LLE to 25 degrees for safety during ambulation (Met)       Start:  03/27/25    Expected End:  04/11/25    Resolved:  04/09/25             Yissel Leblanc, PTA

## 2025-04-21 ENCOUNTER — OFFICE VISIT (OUTPATIENT)
Dept: ORTHOPEDICS | Facility: CLINIC | Age: 60
End: 2025-04-21

## 2025-04-21 ENCOUNTER — HOSPITAL ENCOUNTER (OUTPATIENT)
Dept: RADIOLOGY | Facility: HOSPITAL | Age: 60
Discharge: HOME | End: 2025-04-21
Attending: ORTHOPAEDIC SURGERY

## 2025-04-21 VITALS
OXYGEN SATURATION: 98 % | HEIGHT: 75 IN | BODY MASS INDEX: 33.63 KG/M2 | DIASTOLIC BLOOD PRESSURE: 113 MMHG | HEART RATE: 83 BPM | WEIGHT: 270.5 LBS | SYSTOLIC BLOOD PRESSURE: 190 MMHG

## 2025-04-21 DIAGNOSIS — S82.142D CLOSED FRACTURE OF LEFT TIBIAL PLATEAU WITH ROUTINE HEALING, SUBSEQUENT ENCOUNTER: Primary | ICD-10-CM

## 2025-04-21 DIAGNOSIS — S82.142D CLOSED FRACTURE OF LEFT TIBIAL PLATEAU WITH ROUTINE HEALING, SUBSEQUENT ENCOUNTER: ICD-10-CM

## 2025-04-21 PROCEDURE — 73560 X-RAY EXAM OF KNEE 1 OR 2: CPT | Mod: TC,LT

## 2025-04-21 PROCEDURE — 99213 OFFICE O/P EST LOW 20 MIN: CPT | Mod: PBBFAC,25 | Performed by: ORTHOPAEDIC SURGERY

## 2025-04-21 PROCEDURE — 99024 POSTOP FOLLOW-UP VISIT: CPT | Mod: ,,, | Performed by: ORTHOPAEDIC SURGERY

## 2025-04-21 PROCEDURE — 73560 X-RAY EXAM OF KNEE 1 OR 2: CPT | Mod: 26,LT,, | Performed by: ORTHOPAEDIC SURGERY

## 2025-04-21 PROCEDURE — 99999 PR PBB SHADOW E&M-EST. PATIENT-LVL III: CPT | Mod: PBBFAC,,, | Performed by: ORTHOPAEDIC SURGERY

## 2025-04-21 NOTE — PROGRESS NOTES
Radiology Interpretation        Patient Name: Rufus Preston  Date: 4/21/2025  YOB: 1965  MRN# [id number]        ORDERING DIAGNOSIS:    Encounter Diagnosis   Name Primary?    Closed fracture of left tibial plateau with routine healing, subsequent encounter Yes        Two views left knee skeletally mature individual there is normal mineralization there is a healing fracture lateral tibial plateau plate and screws in place callus forming no bony lesions impression healing fracture left tibial plateau increased callus present plate and screws in place no loosening               Marco Culver MD

## 2025-04-21 NOTE — PROGRESS NOTES
Patient is here for follow-up of his left ORIF of the tibial plateau.  His wounds are well healed.  No instability in the knee noted in his x-rays show he has healing callus present he has some degenerative changes noted lateral compartment.  Overall he is doing well.  I am let him weightbear as tolerates.  He is going to go from in his walker to a walking stick.  He can wean off of the that.  I will recheck him in 6 weeks.  He can start to do activities in his wishes.  He has 0 to past 120° of flexion he is doing well on in his motion.  Patient having some elevated blood pressure today he is going to talk to his primary care about this.

## 2025-04-22 ENCOUNTER — CLINICAL SUPPORT (OUTPATIENT)
Dept: REHABILITATION | Facility: HOSPITAL | Age: 60
End: 2025-04-22

## 2025-04-22 DIAGNOSIS — S82.142D CLOSED FRACTURE OF LEFT TIBIAL PLATEAU WITH ROUTINE HEALING, SUBSEQUENT ENCOUNTER: Primary | ICD-10-CM

## 2025-04-22 PROCEDURE — 97110 THERAPEUTIC EXERCISES: CPT

## 2025-04-22 NOTE — PROGRESS NOTES
Outpatient Rehab    Physical Therapy Visit    Patient Name: Rufus Preston  MRN: [id number]  YOB: 1965  Encounter Date: 4/22/2025    Therapy Diagnosis: No diagnosis found.  Physician: Marco Culver MD    Physician Orders: Eval and Treat  Medical Diagnosis: Closed fracture of left tibial plateau with routine healing, subsequent encounter    Visit # / Visits Authorized:  10 / 12  Insurance Authorization Period: 3/27/2025 to 3/25/2027  Date of Evaluation: 3/27/2025  Plan of Care Certification: 3/27/2025 to 4/25/2025      PT/PTA: PT   Number of PTA visits since last PT visit:0  Time In: 0930   Time Out: 1015  Total Time: 45   Total Billable Time:  45    FOTO:  Intake Score: 25%  Survey Score 1: 50%  Survey Score 2:  %         Subjective   Pt. with no new complaints this AM..         Objective       Knee Range of Motion   Right Knee   Active (deg) Passive (deg) Pain   Flexion 125 125     Extension 2 0         Left Knee   Active (deg) Passive (deg) Pain   Flexion 113 119     Extension 2 2                        Knee Strength   Right Strength Right Pain Left Strength Left  Pain   Flexion (S2) 4+   4     Prone Flexion 4+   4     Extension (L3) 4+   4                   Treatment:  Therapeutic Exercise  TE 1: Nu-step x 10 mins  TE 3: LAQ x30 4#  TE 4: SLR x30 (15 straight/ 15 VMO focused) with 3#cw  TE 5: Hip ER 90/90 GRN TB x10  TE 6: HIp IR 90/90 GRN TB x10  TE 7: Wall Slides for Knee Flexion x 25 reps with knee flexion stretches at endrange  TE 8: Standing Hip 4 way GRN TB 2 x 15 reps each  TE 9: Sidelying Hip ADD 2 x 10 reps with 3# cw on Left  TE 10: Prone Hip Extension with 2# 3 x 10 reps      Time Entry(in minutes):  Therapeutic Exercise Time Entry: 45    Assessment & Plan   Assessment: Patient is progressing well with skilled care  Evaluation/Treatment Tolerance: Patient tolerated treatment well    Patient will continue to benefit from skilled outpatient physical therapy to address the deficits listed  in the problem list box on initial evaluation, provide pt/family education and to maximize pt's level of independence in the home and community environment.     Patient's spiritual, cultural, and educational needs considered and patient agreeable to plan of care and goals.           Plan: Continue with POC as indicated    Goals:   Active       Long Term Goals       Patient will ambulate for community level distances with use of SPC without significant increase in pain in LLE (Progressing)       Start:  03/27/25    Expected End:  04/25/25            Patient will demonstrate active range of motion of LLE knee to 0-125 demonstrating improved range of motion and with less pain (Progressing)       Start:  03/27/25    Expected End:  04/25/25            Patient will increase overall FOTO score by 14 points or more. (Progressing)       Start:  03/27/25    Expected End:  04/25/25              Resolved       Short Term Goals       Patient will increase passive range of motion of left knee to 0-90 without increased pain (Met)       Start:  03/27/25    Expected End:  04/11/25    Resolved:  04/09/25         Patient will increase overall LLE strength to 4+/5 (Met)       Start:  03/27/25    Expected End:  04/11/25    Resolved:  04/22/25         Patient will increase active DF on LLE to 25 degrees for safety during ambulation (Met)       Start:  03/27/25    Expected End:  04/11/25    Resolved:  04/09/25             Deshawn Gutierrez, PT

## 2025-04-23 ENCOUNTER — CLINICAL SUPPORT (OUTPATIENT)
Dept: REHABILITATION | Facility: HOSPITAL | Age: 60
End: 2025-04-23

## 2025-04-23 DIAGNOSIS — S82.142D CLOSED FRACTURE OF LEFT TIBIAL PLATEAU WITH ROUTINE HEALING, SUBSEQUENT ENCOUNTER: Primary | ICD-10-CM

## 2025-04-23 PROCEDURE — 97112 NEUROMUSCULAR REEDUCATION: CPT

## 2025-04-23 PROCEDURE — 97110 THERAPEUTIC EXERCISES: CPT

## 2025-04-23 NOTE — PROGRESS NOTES
Outpatient Rehab    Physical Therapy Visit    Patient Name: Rufus Preston  MRN: [id number]  YOB: 1965  Encounter Date: 4/23/2025    Therapy Diagnosis:   Encounter Diagnosis   Name Primary?    Closed fracture of left tibial plateau with routine healing, subsequent encounter Yes     Physician: Marco Culver MD    Physician Orders: Eval and Treat  Medical Diagnosis: Closed fracture of left tibial plateau with routine healing, subsequent encounter    Visit # / Visits Authorized:  11 / 12  Insurance Authorization Period: 3/27/2025 to 3/25/2027  Date of Evaluation: 3/27/2025  Plan of Care Certification: 3/27/2025 to 4/25/2025      PT/PTA: PT   Number of PTA visits since last PT visit:0  Time In: 0930   Time Out: 1011  Total Time: 41   Total Billable Time:               Subjective   Patient is doing well today and is walking more without AD.  Pain reported as 2/10.      Objective            Treatment:  Therapeutic Exercise  TE 1: Recumbent Bike x10 mins  TE 2: Slant Board Stretch 4x30 secs  TE 3: LAQ x30 20# (2 up 1 down)  TE 4: HS Curl x20 (2 up 1 down)  TE 5: Nicola Stretch 4x30 secs RLE  TE 7: Wall Slides for Knee Flexion x 25 reps with knee flexion stretches at endrange  Balance/Neuromuscular Re-Education  NMR 1: Cone stepping to facilitate knee flexion during swing phase of gait  Therapeutic Activity  TA 2: Hamstring Stretch 3x30 secs  TA 3: Step Downs on 4 inch step x20  TA 4: Forward Step- Ups on 6 inch step x 20 reps    Time Entry(in minutes):  Neuromuscular Re-Education Time Entry: 8  Therapeutic Exercise Time Entry: 35    Assessment & Plan   Assessment: Patient is progressing well with skilled care       Patient will continue to benefit from skilled outpatient physical therapy to address the deficits listed in the problem list box on initial evaluation, provide pt/family education and to maximize pt's level of independence in the home and community environment.     Patient's spiritual, cultural,  and educational needs considered and patient agreeable to plan of care and goals.           Plan: Continue with POC as indicated    Goals:   Active       Long Term Goals       Patient will ambulate for community level distances with use of SPC without significant increase in pain in LLE (Progressing)       Start:  03/27/25    Expected End:  04/25/25            Patient will demonstrate active range of motion of LLE knee to 0-125 demonstrating improved range of motion and with less pain (Progressing)       Start:  03/27/25    Expected End:  04/25/25            Patient will increase overall FOTO score by 14 points or more. (Progressing)       Start:  03/27/25    Expected End:  04/25/25              Resolved       Short Term Goals       Patient will increase passive range of motion of left knee to 0-90 without increased pain (Met)       Start:  03/27/25    Expected End:  04/11/25    Resolved:  04/09/25         Patient will increase overall LLE strength to 4+/5 (Met)       Start:  03/27/25    Expected End:  04/11/25    Resolved:  04/22/25         Patient will increase active DF on LLE to 25 degrees for safety during ambulation (Met)       Start:  03/27/25    Expected End:  04/11/25    Resolved:  04/09/25             Deshawn Gutierrez PT

## 2025-04-25 ENCOUNTER — CLINICAL SUPPORT (OUTPATIENT)
Dept: REHABILITATION | Facility: HOSPITAL | Age: 60
End: 2025-04-25

## 2025-04-25 DIAGNOSIS — S82.142D CLOSED FRACTURE OF LEFT TIBIAL PLATEAU WITH ROUTINE HEALING, SUBSEQUENT ENCOUNTER: Primary | ICD-10-CM

## 2025-04-25 PROCEDURE — 97110 THERAPEUTIC EXERCISES: CPT

## 2025-04-25 NOTE — PROGRESS NOTES
Outpatient Rehab    Physical Therapy Progress Note : Updated Plan of Care    Patient Name: Rufus Preston  MRN: [id number]  YOB: 1965  Encounter Date: 4/25/2025    Therapy Diagnosis:   Encounter Diagnosis   Name Primary?    Closed fracture of left tibial plateau with routine healing, subsequent encounter Yes     Physician: Marco Culver MD    Physician Orders: Eval and Treat  Medical Diagnosis: Closed fracture of left tibial plateau with routine healing, subsequent encounter    Visit # / Visits Authorized:  12 / 12  Insurance Authorization Period: 3/27/2025 to 3/25/2027  Date of Evaluation: 3/27/2025  Plan of Care Certification: 3/27/2025 to 4/25/2025      PT/PTA: PT   Number of PTA visits since last PT visit:0  Time In: 0930   Time Out: 1015  Total Time: 45   Total Billable Time:  45    FOTO:  Intake Score: 25%  Survey Score 1: 50%  Survey Score 2:  %         Subjective   Patient states his knee is sore today as he was on it alot yesterday.  Pain reported as 4/10.      Objective       Knee Range of Motion   Right Knee   Active (deg) Passive (deg) Pain   Flexion 125 125     Extension 2 2         Left Knee   Active (deg) Passive (deg) Pain   Flexion 113 119     Extension 2 2                        Hip Strength - Planes of Motion   Right Strength Right Pain Left Strength Left  Pain   Flexion (L2) 4   4     Extension 4   4     ABduction 4   4     ADduction 4   4     Internal Rotation 4   4     External Rotation 4   4         Knee Strength   Right Strength Right Pain Left Strength Left  Pain   Flexion (S2) 4   4     Prone Flexion 4   4     Extension (L3) 4   4                   Treatment:  Therapeutic Exercise  TE 1: Recumbent Bike x10 mins  TE 2: Slant Board Stretch 4x30 secs  TE 3: LAQ x30 30# (2 up 1 down)  TE 4: HS Curl x20 30# (2 up 1 down)  TE 5: Wheelchair Knee Flexion x20  TE 6: Leg Press DL x30 70#, SL x30 40#  TE 7: HS Stretch 4x30 secs  TE 8: Standing Hip 4 way GRN TB 2 x 15 reps each  TE 9:  Sidelying Hip ADD 2 x 10 reps with 3# cw on Left  TE 10: Prone Hip Extension with 2# 3 x 10 reps      Time Entry(in minutes):  Therapeutic Exercise Time Entry: 45    Assessment & Plan   Plan  From a physical therapy perspective, the patient would benefit from: Skilled Rehab Services    Planned therapy interventions include: Therapeutic exercise, Neuromuscular re-education, Therapeutic activities, Gait training, and Manual therapy.    Planned modalities to include: Electrical stimulation - attended.        Visit Frequency: 2 times Per Week for 4 Weeks.       This plan was discussed with Patient.   Discussion participants: Agreed Upon Plan of Care             Patient will continue to benefit from skilled outpatient physical therapy to address the deficits listed in the problem list box on initial evaluation, provide pt/family education and to maximize pt's level of independence in the home and community environment.     Patient's spiritual, cultural, and educational needs considered and patient agreeable to plan of care and goals.           Goals:   Active       Long Term Goals       Patient will ambulate for community level distances with use of SPC without significant increase in pain in LLE (Progressing)       Start:  03/27/25    Expected End:  04/25/25            Patient will demonstrate active range of motion of LLE knee to 0-125 demonstrating improved range of motion and with less pain (Progressing)       Start:  03/27/25    Expected End:  04/25/25            Patient will increase overall FOTO score by 14 points or more. (Progressing)       Start:  03/27/25    Expected End:  04/25/25              Resolved       Short Term Goals       Patient will increase passive range of motion of left knee to 0-90 without increased pain (Met)       Start:  03/27/25    Expected End:  04/11/25    Resolved:  04/09/25         Patient will increase overall LLE strength to 4+/5 (Met)       Start:  03/27/25    Expected End:  04/11/25     Resolved:  04/22/25         Patient will increase active DF on LLE to 25 degrees for safety during ambulation (Met)       Start:  03/27/25    Expected End:  04/11/25    Resolved:  04/09/25             Deshawn Gutierrez PT

## 2025-04-29 ENCOUNTER — CLINICAL SUPPORT (OUTPATIENT)
Dept: REHABILITATION | Facility: HOSPITAL | Age: 60
End: 2025-04-29

## 2025-04-29 DIAGNOSIS — S82.142D CLOSED FRACTURE OF LEFT TIBIAL PLATEAU WITH ROUTINE HEALING, SUBSEQUENT ENCOUNTER: Primary | ICD-10-CM

## 2025-04-29 PROCEDURE — 97112 NEUROMUSCULAR REEDUCATION: CPT

## 2025-04-29 PROCEDURE — 97110 THERAPEUTIC EXERCISES: CPT

## 2025-04-29 NOTE — PROGRESS NOTES
Outpatient Rehab    Physical Therapy Visit    Patient Name: Rufus Preston  MRN: [id number]  YOB: 1965  Encounter Date: 4/29/2025    Therapy Diagnosis:   Encounter Diagnosis   Name Primary?    Closed fracture of left tibial plateau with routine healing, subsequent encounter Yes     Physician: Marco Culver MD    Physician Orders: Eval and Treat  Medical Diagnosis: Closed fracture of left tibial plateau with routine healing, subsequent encounter    Visit # / Visits Authorized:  13 / 16  Insurance Authorization Period: 3/27/2025 to 3/25/2027  Date of Evaluation: 3/27/2025  Plan of Care Certification: 4/25/2025 to 5/23/2025     PT/PTA: PT   Number of PTA visits since last PT visit:0  Time In: 0915   Time Out: 1000  Total Time: 45   Total Billable Time:  45      Subjective   Patient states he feels he is walking better..  Pain reported as 1/10.      Objective            Treatment:  Therapeutic Exercise  TE 1: Recumbent Bike x8 mins  Balance/Neuromuscular Re-Education  NMR 1: Cone stepping to facilitate knee flexion during swing phase of gait 20 foot length 8 trials  NMR 2: Intermittent SL Balance x3 secs each 20 times each side    Time Entry(in minutes):  Neuromuscular Re-Education Time Entry: 10  Therapeutic Exercise Time Entry: 35    Assessment & Plan   Assessment: Patient is progressing well with skilled care       Patient will continue to benefit from skilled outpatient physical therapy to address the deficits listed in the problem list box on initial evaluation, provide pt/family education and to maximize pt's level of independence in the home and community environment.     Patient's spiritual, cultural, and educational needs considered and patient agreeable to plan of care and goals.           Plan: Continue with POC as indicated    Goals:   Active       Long Term Goals       Patient will ambulate for community level distances with use of SPC without significant increase in pain in LLE (Progressing)        Start:  03/27/25    Expected End:  04/25/25            Patient will demonstrate active range of motion of LLE knee to 0-125 demonstrating improved range of motion and with less pain (Progressing)       Start:  03/27/25    Expected End:  04/25/25            Patient will increase overall FOTO score by 14 points or more. (Progressing)       Start:  03/27/25    Expected End:  04/25/25              Resolved       Short Term Goals       Patient will increase passive range of motion of left knee to 0-90 without increased pain (Met)       Start:  03/27/25    Expected End:  04/11/25    Resolved:  04/09/25         Patient will increase overall LLE strength to 4+/5 (Met)       Start:  03/27/25    Expected End:  04/11/25    Resolved:  04/22/25         Patient will increase active DF on LLE to 25 degrees for safety during ambulation (Met)       Start:  03/27/25    Expected End:  04/11/25    Resolved:  04/09/25             Deshawn Gutierrez, PT

## 2025-05-02 ENCOUNTER — CLINICAL SUPPORT (OUTPATIENT)
Dept: REHABILITATION | Facility: HOSPITAL | Age: 60
End: 2025-05-02

## 2025-05-02 DIAGNOSIS — S82.142D CLOSED FRACTURE OF LEFT TIBIAL PLATEAU WITH ROUTINE HEALING, SUBSEQUENT ENCOUNTER: Primary | ICD-10-CM

## 2025-05-02 PROCEDURE — 97110 THERAPEUTIC EXERCISES: CPT

## 2025-05-02 NOTE — PROGRESS NOTES
Outpatient Rehab    Physical Therapy Visit    Patient Name: Rufus Preston  MRN: [id number]  YOB: 1965  Encounter Date: 5/2/2025    Therapy Diagnosis:   Encounter Diagnosis   Name Primary?    Closed fracture of left tibial plateau with routine healing, subsequent encounter Yes     Physician: Marco Culver MD    Physician Orders: Eval and Treat  Medical Diagnosis: Closed fracture of left tibial plateau with routine healing, subsequent encounter    Visit # / Visits Authorized:  14 / 16  Insurance Authorization Period: 3/27/2025 to 3/25/2027  Date of Evaluation: 3/27/2025  Plan of Care Certification: 4/25/2025 to 5/23/2025     PT/PTA: PT   Number of PTA visits since last PT visit:0  Time In: 0915   Time Out: 1000  Total Time: 45   Total Billable Time:  45    FOTO:  Intake Score:  %  Survey Score 1:  %  Survey Score 2:  %         Subjective   Patient comes in ambulating without use of AD, although he did bring it with him.  Pain reported as 1/10.      Objective            Treatment:  Therapeutic Exercise  TE 1: Recumbent Bike x8 mins  TE 2: Slant Board Stretch 4x30 secs  TE 3: LAQ x30 10#  TE 4: HS Curl x30 GYTB  TE 6: Leg Press DL x30 70#, SL x30 40#  TE 7: HS Stretch 4x30 secs  TE 8: Standing TKE x30 BTB  Balance/Neuromuscular Re-Education  NMR 1: Cone stepping to facilitate knee flexion during swing phase of gait 20 foot length 8 trials    Time Entry(in minutes):  Therapeutic Exercise Time Entry: 45    Assessment & Plan   Assessment:    Evaluation/Treatment Tolerance: Patient tolerated treatment well    Patient will continue to benefit from skilled outpatient physical therapy to address the deficits listed in the problem list box on initial evaluation, provide pt/family education and to maximize pt's level of independence in the home and community environment.     Patient's spiritual, cultural, and educational needs considered and patient agreeable to plan of care and goals.           Plan: Continue  with POC as indicated    Goals:   Active       Long Term Goals       Patient will ambulate for community level distances with use of SPC without significant increase in pain in LLE (Progressing)       Start:  03/27/25    Expected End:  04/25/25            Patient will demonstrate active range of motion of LLE knee to 0-125 demonstrating improved range of motion and with less pain (Progressing)       Start:  03/27/25    Expected End:  04/25/25            Patient will increase overall FOTO score by 14 points or more. (Progressing)       Start:  03/27/25    Expected End:  04/25/25              Resolved       Short Term Goals       Patient will increase passive range of motion of left knee to 0-90 without increased pain (Met)       Start:  03/27/25    Expected End:  04/11/25    Resolved:  04/09/25         Patient will increase overall LLE strength to 4+/5 (Met)       Start:  03/27/25    Expected End:  04/11/25    Resolved:  04/22/25         Patient will increase active DF on LLE to 25 degrees for safety during ambulation (Met)       Start:  03/27/25    Expected End:  04/11/25    Resolved:  04/09/25             Deshawn Gutierrez PT

## 2025-05-06 ENCOUNTER — CLINICAL SUPPORT (OUTPATIENT)
Dept: REHABILITATION | Facility: HOSPITAL | Age: 60
End: 2025-05-06

## 2025-05-06 DIAGNOSIS — S82.142D CLOSED FRACTURE OF LEFT TIBIAL PLATEAU WITH ROUTINE HEALING, SUBSEQUENT ENCOUNTER: Primary | ICD-10-CM

## 2025-05-06 PROCEDURE — 97530 THERAPEUTIC ACTIVITIES: CPT | Mod: CQ

## 2025-05-06 PROCEDURE — 97110 THERAPEUTIC EXERCISES: CPT | Mod: CQ

## 2025-05-06 NOTE — PROGRESS NOTES
"  Outpatient Rehab    Physical Therapy Visit    Patient Name: Rufus Preston  MRN: [id number]  YOB: 1965  Encounter Date: 5/6/2025    Therapy Diagnosis:   Encounter Diagnosis   Name Primary?    Closed fracture of left tibial plateau with routine healing, subsequent encounter Yes     Physician: Marco Culver MD    Physician Orders: Eval and Treat  Medical Diagnosis: Closed fracture of left tibial plateau with routine healing, subsequent encounter    Visit # / Visits Authorized:  15 / 16  Insurance Authorization Period: 3/27/2025 to 3/25/2027  Date of Evaluation: 3/27/2025  Plan of Care Certification: 4/25/2025 to 5/23/2025      PT/PTA: PTA   Number of PTA visits since last PT visit:1  Time In:   915  Time Out: 1005   Total Time (in minutes):   50  Total Billable Time (in minutes):  50    FOTO:  Intake Score:  %  Survey Score 2:  %  Survey Score 3:  %         Subjective   Pt. states he is going to the gym for independent work outs and walking without cane since Saturday..  Pain reported as 0/10. Pt. states his legs get stiff after exercise, but reports "that happened before I broke my leg"    Objective            Treatment:  Therapeutic Exercise  TE 1: Recumbent Bike x8 mins to Level 2 resistance with increasing knee flexion  TE 2: TKE x50 w/ ball  TE 3: LAQ x30 10# Single Leg  TE 4: HS Curl 2 x20 50#  TE 5: Leg Press with 60 # x 40 reps  TE 8: Standing Hip 4 way GRN TB  x 15 reps each  TE 10: Heel Raises on step for increased DF x40  Therapeutic Activity  TA 1: Slant Stretch 3 x 30 sec  TA 2: Step - Ups on 8 inch step x 20 reps  TA 3: Step Downs on 6 inch step x20 with UE support  TA 4: Hamstring Stretches 3 x 30 seconds    Time Entry(in minutes):   TE: 35  TA: 15    Assessment & Plan   Assessment: Pt. with minimal discomfort reported at end of treatment.  Evaluation/Treatment Tolerance: Patient tolerated treatment well    Patient will continue to benefit from skilled outpatient physical therapy to " address the deficits listed in the problem list box on initial evaluation, provide pt/family education and to maximize pt's level of independence in the home and community environment.     Patient's spiritual, cultural, and educational needs considered and patient agreeable to plan of care and goals.           Plan:  Plan to DC next visit if pt. Cont. To meet PT goals.     Goals:   Active       Long Term Goals       Patient will ambulate for community level distances with use of SPC without significant increase in pain in LLE (Progressing)       Start:  03/27/25    Expected End:  04/25/25            Patient will demonstrate active range of motion of LLE knee to 0-125 demonstrating improved range of motion and with less pain (Progressing)       Start:  03/27/25    Expected End:  04/25/25            Patient will increase overall FOTO score by 14 points or more. (Progressing)       Start:  03/27/25    Expected End:  04/25/25              Resolved       Short Term Goals       Patient will increase passive range of motion of left knee to 0-90 without increased pain (Met)       Start:  03/27/25    Expected End:  04/11/25    Resolved:  04/09/25         Patient will increase overall LLE strength to 4+/5 (Met)       Start:  03/27/25    Expected End:  04/11/25    Resolved:  04/22/25         Patient will increase active DF on LLE to 25 degrees for safety during ambulation (Met)       Start:  03/27/25    Expected End:  04/11/25    Resolved:  04/09/25             Yissel Leblanc, PTA

## 2025-05-09 ENCOUNTER — CLINICAL SUPPORT (OUTPATIENT)
Dept: REHABILITATION | Facility: HOSPITAL | Age: 60
End: 2025-05-09

## 2025-05-09 DIAGNOSIS — S82.142D CLOSED FRACTURE OF LEFT TIBIAL PLATEAU WITH ROUTINE HEALING, SUBSEQUENT ENCOUNTER: Primary | ICD-10-CM

## 2025-05-09 PROCEDURE — 97110 THERAPEUTIC EXERCISES: CPT

## 2025-05-09 NOTE — PROGRESS NOTES
Outpatient Rehab    Physical Therapy Discharge    Patient Name: Rufus Preston  MRN: [id number]  YOB: 1965  Encounter Date: 5/9/2025    Therapy Diagnosis:   Encounter Diagnosis   Name Primary?    Closed fracture of left tibial plateau with routine healing, subsequent encounter Yes     Physician: Marco Culver MD    Physician Orders: Eval and Treat  Medical Diagnosis: Closed fracture of left tibial plateau with routine healing, subsequent encounter    Visit # / Visits Authorized:  16 / 16  Insurance Authorization Period: 3/27/2025 to 3/25/2027  Date of Evaluation: 3/27/2025  Plan of Care Certification: 4/25/2025 to 5/23/2025      PT/PTA: PT   Number of PTA visits since last PT visit:0  Time In: 0930   Time Out: 1000  Total Time (in minutes): 30   Total Billable Time (in minutes):      FOTO:  Intake Score: 25%  Survey Score 2: 50%  Survey Score 3: 63%         Subjective   Patient arrives to clinic without complaints today.  Pain reported as 0/10.      Objective       Knee Range of Motion   Right Knee   Active (deg) Passive (deg) Pain   Flexion 125 125     Extension 2 2         Left Knee   Active (deg) Passive (deg) Pain   Flexion 119 123     Extension 2 2                        Hip Strength - Planes of Motion   Right Strength Right Pain Left Strength Left  Pain   Flexion (L2) 4+   4     Extension 4+   5     ABduction 4+   5     ADduction 4+   5     Internal Rotation 4+   4     External Rotation 4+   4         Knee Strength   Right Strength Right Pain Left Strength Left  Pain   Flexion (S2) 4   5     Prone Flexion 4   5     Extension (L3) 4   5                   Gait Analysis  Base of Support: Normal    Right Foot Contact Pattern: Heel to toe    Left Foot Contact Pattern: Heel to toe  Hip Observations During Gait  Bilateral: Hip Unremarkable During Gait  Knee Observations During Gait  Bilateral: Knee Unremarkable During Gait         Treatment:  Therapeutic Exercise  TE 1: Recumbent Bike x8 mins to Level  2 resistance with increasing knee flexion  TE 3: LAQ x30 10# Single Leg  TE 7: HS Stretch 4x30 secs  TE 9: Assessment of set goals  TE 10: Heel Raises on step for increased DF x40      Time Entry(in minutes):  Therapeutic Exercise Time Entry: 30    Assessment & Plan   Assessment: Patient has responded well to skilled care and will be discharged on this date. He is able to ambulate without the use of AD and has resumed his normal activity.  Evaluation/Treatment Tolerance: Patient tolerated treatment well    Patient's spiritual, cultural, and educational needs considered and patient agreeable to plan of care and goals.           Plan: Patient will be discharged on this date.    Goals:   Resolved       Long Term Goals       Patient will ambulate for community level distances with use of SPC without significant increase in pain in LLE (Met)       Start:  03/27/25    Expected End:  04/25/25    Resolved:  05/09/25         Patient will demonstrate active range of motion of LLE knee to 0-125 demonstrating improved range of motion and with less pain (Met)       Start:  03/27/25    Expected End:  04/25/25    Resolved:  05/09/25         Patient will increase overall FOTO score by 14 points or more. (Met)       Start:  03/27/25    Expected End:  04/25/25    Resolved:  05/09/25            Short Term Goals       Patient will increase passive range of motion of left knee to 0-90 without increased pain (Met)       Start:  03/27/25    Expected End:  04/11/25    Resolved:  04/09/25         Patient will increase overall LLE strength to 4+/5 (Met)       Start:  03/27/25    Expected End:  04/11/25    Resolved:  04/22/25         Patient will increase active DF on LLE to 25 degrees for safety during ambulation (Met)       Start:  03/27/25    Expected End:  04/11/25    Resolved:  04/09/25             Deshawn Gutierrez PT

## 2025-06-02 ENCOUNTER — HOSPITAL ENCOUNTER (OUTPATIENT)
Dept: RADIOLOGY | Facility: HOSPITAL | Age: 60
Discharge: HOME | End: 2025-06-02
Attending: ORTHOPAEDIC SURGERY

## 2025-06-02 ENCOUNTER — OFFICE VISIT (OUTPATIENT)
Dept: ORTHOPEDICS | Facility: CLINIC | Age: 60
End: 2025-06-02

## 2025-06-02 VITALS
BODY MASS INDEX: 33.65 KG/M2 | HEIGHT: 75 IN | HEART RATE: 74 BPM | WEIGHT: 270.63 LBS | OXYGEN SATURATION: 99 % | SYSTOLIC BLOOD PRESSURE: 180 MMHG | DIASTOLIC BLOOD PRESSURE: 111 MMHG

## 2025-06-02 DIAGNOSIS — S82.142D CLOSED FRACTURE OF LEFT TIBIAL PLATEAU WITH ROUTINE HEALING, SUBSEQUENT ENCOUNTER: Primary | ICD-10-CM

## 2025-06-02 DIAGNOSIS — S82.142D CLOSED FRACTURE OF LEFT TIBIAL PLATEAU WITH ROUTINE HEALING, SUBSEQUENT ENCOUNTER: ICD-10-CM

## 2025-06-02 PROCEDURE — 99024 POSTOP FOLLOW-UP VISIT: CPT | Mod: ,,, | Performed by: ORTHOPAEDIC SURGERY

## 2025-06-02 PROCEDURE — 99999 PR PBB SHADOW E&M-EST. PATIENT-LVL III: CPT | Mod: PBBFAC,,, | Performed by: ORTHOPAEDIC SURGERY

## 2025-06-02 PROCEDURE — 99213 OFFICE O/P EST LOW 20 MIN: CPT | Mod: PBBFAC,25 | Performed by: ORTHOPAEDIC SURGERY

## 2025-06-02 PROCEDURE — 73564 X-RAY EXAM KNEE 4 OR MORE: CPT | Mod: TC,LT

## 2025-07-31 ENCOUNTER — TELEPHONE (OUTPATIENT)
Dept: ORTHOPEDICS | Facility: CLINIC | Age: 60
End: 2025-07-31

## 2025-07-31 NOTE — TELEPHONE ENCOUNTER
Called [facility] and spoke to Hedy and Dr. Tidwell.  They stated patient had an elbow fracture.  I advised them I would call Dr. Culver. Spoke to Dr. Culver and he said for them to text him some pictures of the x-rays. Notified Hedy of this

## 2025-07-31 NOTE — TELEPHONE ENCOUNTER
Copied from CRM #[id number]. Topic: Appointments - Appointment Access  >> Jul 31, 2025  4:27 PM Rose wrote:  Who Called: Nurse Hedy from Dr. Alberts's office pertaining to pt. Rufus Preston    Caller is requesting a sooner appointment. Caller declined first available appointment listed below. Caller will not accept being placed on the waitlist and is requesting a message be sent to doctor.    When is the first available appointment?N/A  Options offered (Virtual Visit, Urgent Care): Ortho  Symptoms:Multiple Fractures in Rt. Elbow      Preferred Method of Contact: Phone Call    Best Call Back Number, if different: Please call Parris at [phone]  if after 5 PM please call [phone]  Additional Information: Hedy from Dr. Alberts's office at the [facility]   stated Pt. Has Multiple fractures in Rt. Elbow. Needs an appt.

## 2025-08-04 ENCOUNTER — HOSPITAL ENCOUNTER (OUTPATIENT)
Dept: RADIOLOGY | Facility: HOSPITAL | Age: 60
Discharge: HOME | End: 2025-08-04
Attending: ORTHOPAEDIC SURGERY

## 2025-08-04 ENCOUNTER — CLINICAL SUPPORT (OUTPATIENT)
Dept: CARDIOLOGY | Facility: CLINIC | Age: 60
End: 2025-08-04

## 2025-08-04 ENCOUNTER — OFFICE VISIT (OUTPATIENT)
Dept: ORTHOPEDICS | Facility: CLINIC | Age: 60
End: 2025-08-04

## 2025-08-04 VITALS
OXYGEN SATURATION: 98 % | HEART RATE: 76 BPM | HEIGHT: 75 IN | WEIGHT: 270 LBS | DIASTOLIC BLOOD PRESSURE: 98 MMHG | BODY MASS INDEX: 33.57 KG/M2 | SYSTOLIC BLOOD PRESSURE: 188 MMHG

## 2025-08-04 DIAGNOSIS — Z01.810 PRE-OPERATIVE CARDIOVASCULAR EXAMINATION: ICD-10-CM

## 2025-08-04 DIAGNOSIS — M25.521 CHRONIC ELBOW PAIN, RIGHT: Primary | ICD-10-CM

## 2025-08-04 DIAGNOSIS — S46.311A RUPTURE OF RIGHT TRICEPS TENDON, INITIAL ENCOUNTER: ICD-10-CM

## 2025-08-04 DIAGNOSIS — G89.29 CHRONIC ELBOW PAIN, RIGHT: ICD-10-CM

## 2025-08-04 DIAGNOSIS — G89.29 CHRONIC ELBOW PAIN, RIGHT: Primary | ICD-10-CM

## 2025-08-04 DIAGNOSIS — M25.521 CHRONIC ELBOW PAIN, RIGHT: ICD-10-CM

## 2025-08-04 PROCEDURE — 93005 ELECTROCARDIOGRAM TRACING: CPT | Mod: PBBFAC | Performed by: HOSPITALIST

## 2025-08-04 PROCEDURE — 73070 X-RAY EXAM OF ELBOW: CPT | Mod: TC,RT

## 2025-08-04 PROCEDURE — 99999 PR PBB SHADOW E&M-EST. PATIENT-LVL III: CPT | Mod: PBBFAC,,, | Performed by: ORTHOPAEDIC SURGERY

## 2025-08-04 PROCEDURE — 99212 OFFICE O/P EST SF 10 MIN: CPT | Mod: PBBFAC,25

## 2025-08-04 PROCEDURE — 73070 X-RAY EXAM OF ELBOW: CPT | Mod: 26,RT,, | Performed by: ORTHOPAEDIC SURGERY

## 2025-08-04 PROCEDURE — 93010 ELECTROCARDIOGRAM REPORT: CPT | Mod: S$PBB,,, | Performed by: HOSPITALIST

## 2025-08-04 PROCEDURE — 99213 OFFICE O/P EST LOW 20 MIN: CPT | Mod: PBBFAC,25 | Performed by: ORTHOPAEDIC SURGERY

## 2025-08-04 PROCEDURE — 99214 OFFICE O/P EST MOD 30 MIN: CPT | Mod: S$PBB,,, | Performed by: ORTHOPAEDIC SURGERY

## 2025-08-04 PROCEDURE — 99999 PR PBB SHADOW E&M-EST. PATIENT-LVL II: CPT | Mod: PBBFAC,,,

## 2025-08-04 RX ORDER — VALACYCLOVIR HYDROCHLORIDE 1 G/1
TABLET, FILM COATED ORAL
COMMUNITY
Start: 2025-07-29

## 2025-08-04 RX ORDER — GABAPENTIN 100 MG/1
CAPSULE ORAL
COMMUNITY
Start: 2025-07-29

## 2025-08-04 RX ORDER — PREDNISONE 10 MG/1
TABLET ORAL
COMMUNITY
Start: 2025-07-31

## 2025-08-04 RX ORDER — OLMESARTAN MEDOXOMIL 20 MG/1
TABLET ORAL
COMMUNITY
Start: 2025-07-29

## 2025-08-04 NOTE — PATIENT INSTRUCTIONS
Your surgery is scheduled at [facility] in [place] for 2025    Pre-Op Testin2025    _______ Lab (Clinic Lab 1st Floor)  _______ Chest X-ray ([facility] 1st Floor)  ___x____ EKG (Clinic 2nd Floor)    *[facility] will contact you the day before surgery to give you the arrival time.    *A  is required to provide transportation for you the day of surgery.    *Do NOT eat or drink anything after midnight the night before your surgery.    *Bring all medications in their original bottles.    *Bring anything you may need for an overnight stay.     *Bathe with Hibiclens the night or morning before surgery.    *The morning of your surgery ONLY take blood pressure medications, heart medications, medications for acid reflux, and thyroid medications (the morning dose only).  *Take these medications with a sip of water.    *Do not take Insulin or Diabetic Medications the night before or the morning of your surgery, unless directed otherwise.    *Be sure that you have stopped blood thinners at the appropriate time, as instructed.  (_____ days prior to surgery)    *Bring your C-Pap machine if you have one.    *All jewelry and piercings MUST be removed prior to surgery.    *False eye lashes must be removed prior to surgery.    *Any questions regarding co-pays or deductibles with insurance, please contact the [facility]or[facility] at #[phone].    *For Financial Assistance you may call #[phone] or #[phone].    Thank you for choosing Dr. Marco Culver for your Orthopedic needs.  We look forward to caring for you. If you have any questions, please contact our office at [phone].
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
## 2025-08-06 ENCOUNTER — TELEPHONE (OUTPATIENT)
Dept: ORTHOPEDICS | Facility: CLINIC | Age: 60
End: 2025-08-06

## 2025-08-06 LAB
OHS QRS DURATION: 102 MS
OHS QTC CALCULATION: 424 MS

## 2025-08-06 NOTE — TELEPHONE ENCOUNTER
Called patient's wife Parris and she wanted to confirm his check in time for surgery tomorrow. I advised her that he needs to be here at 9:00a.m. and NPO after midnight tonight except for his BP med with a sip of water in the morning. She voiced understanding.

## 2025-08-06 NOTE — TELEPHONE ENCOUNTER
Copied from CRM #[id number]. Topic: General Inquiry - Patient Advice  >> Aug 6, 2025  9:39 AM Kobe wrote:  Who Called: Rufus Preston    Wife is calling she said she thinks she misunderstood the time they was told to come in for surgery des and she wants to know if you will call her back, thank you     Preferred Method of Contact: Phone Call  Patient's Preferred Phone Number on File: [phone]   Best Call Back Number, if different:  Additional Information:

## 2025-08-07 ENCOUNTER — ANESTHESIA (OUTPATIENT)
Dept: SURGERY | Facility: HOSPITAL | Age: 60
End: 2025-08-07

## 2025-08-07 ENCOUNTER — HOSPITAL ENCOUNTER (OUTPATIENT)
Facility: HOSPITAL | Age: 60
Discharge: HOME | End: 2025-08-07
Attending: ORTHOPAEDIC SURGERY | Admitting: ORTHOPAEDIC SURGERY

## 2025-08-07 ENCOUNTER — ANESTHESIA EVENT (OUTPATIENT)
Dept: SURGERY | Facility: HOSPITAL | Age: 60
End: 2025-08-07

## 2025-08-07 VITALS
HEART RATE: 73 BPM | HEIGHT: 75 IN | SYSTOLIC BLOOD PRESSURE: 153 MMHG | RESPIRATION RATE: 16 BRPM | OXYGEN SATURATION: 93 % | DIASTOLIC BLOOD PRESSURE: 90 MMHG | WEIGHT: 257 LBS | BODY MASS INDEX: 31.95 KG/M2 | TEMPERATURE: 99 F

## 2025-08-07 DIAGNOSIS — S46.311A RUPTURE OF RIGHT TRICEPS TENDON: ICD-10-CM

## 2025-08-07 DIAGNOSIS — I49.9 IRREGULAR HEART BEAT: ICD-10-CM

## 2025-08-07 PROCEDURE — 36000708 HC OR TIME LEV III 1ST 15 MIN: Performed by: ORTHOPAEDIC SURGERY

## 2025-08-07 PROCEDURE — 63600175 PHARM REV CODE 636 W HCPCS: Performed by: NURSE ANESTHETIST, CERTIFIED REGISTERED

## 2025-08-07 PROCEDURE — 93005 ELECTROCARDIOGRAM TRACING: CPT

## 2025-08-07 PROCEDURE — 27000716 HC OXISENSOR PROBE, ANY SIZE: Performed by: NURSE ANESTHETIST, CERTIFIED REGISTERED

## 2025-08-07 PROCEDURE — 27201423 OPTIME MED/SURG SUP & DEVICES STERILE SUPPLY: Performed by: ORTHOPAEDIC SURGERY

## 2025-08-07 PROCEDURE — 71000033 HC RECOVERY, INTIAL HOUR: Performed by: ORTHOPAEDIC SURGERY

## 2025-08-07 PROCEDURE — 63600175 PHARM REV CODE 636 W HCPCS: Performed by: ANESTHESIOLOGY

## 2025-08-07 PROCEDURE — C1713 ANCHOR/SCREW BN/BN,TIS/BN: HCPCS | Performed by: ORTHOPAEDIC SURGERY

## 2025-08-07 PROCEDURE — 24342 REPAIR OF RUPTURED TENDON: CPT | Mod: RT,,, | Performed by: ORTHOPAEDIC SURGERY

## 2025-08-07 PROCEDURE — 37000008 HC ANESTHESIA 1ST 15 MINUTES: Performed by: ORTHOPAEDIC SURGERY

## 2025-08-07 PROCEDURE — 71000015 HC POSTOP RECOV 1ST HR: Performed by: ORTHOPAEDIC SURGERY

## 2025-08-07 PROCEDURE — 25000003 PHARM REV CODE 250: Performed by: ORTHOPAEDIC SURGERY

## 2025-08-07 PROCEDURE — 27000510 HC BLANKET BAIR HUGGER ANY SIZE: Performed by: NURSE ANESTHETIST, CERTIFIED REGISTERED

## 2025-08-07 PROCEDURE — 71000016 HC POSTOP RECOV ADDL HR: Performed by: ORTHOPAEDIC SURGERY

## 2025-08-07 PROCEDURE — 93010 ELECTROCARDIOGRAM REPORT: CPT | Mod: ,,, | Performed by: INTERNAL MEDICINE

## 2025-08-07 PROCEDURE — 25000003 PHARM REV CODE 250: Performed by: NURSE ANESTHETIST, CERTIFIED REGISTERED

## 2025-08-07 PROCEDURE — 37000009 HC ANESTHESIA EA ADD 15 MINS: Performed by: ORTHOPAEDIC SURGERY

## 2025-08-07 PROCEDURE — 27000177 HC AIRWAY, LARYNGEAL MASK: Performed by: NURSE ANESTHETIST, CERTIFIED REGISTERED

## 2025-08-07 PROCEDURE — 36000709 HC OR TIME LEV III EA ADD 15 MIN: Performed by: ORTHOPAEDIC SURGERY

## 2025-08-07 DEVICE — IMPLANTABLE DEVICE: Type: IMPLANTABLE DEVICE | Site: ELBOW | Status: FUNCTIONAL

## 2025-08-07 RX ORDER — CEFAZOLIN SODIUM 1 G/3ML
INJECTION, POWDER, FOR SOLUTION INTRAMUSCULAR; INTRAVENOUS
Status: DISCONTINUED | OUTPATIENT
Start: 2025-08-07 | End: 2025-08-07

## 2025-08-07 RX ORDER — OXYCODONE HYDROCHLORIDE 5 MG/1
5 TABLET ORAL
Status: DISCONTINUED | OUTPATIENT
Start: 2025-08-07 | End: 2025-08-07 | Stop reason: HOSPADM

## 2025-08-07 RX ORDER — LABETALOL HYDROCHLORIDE 5 MG/ML
10 INJECTION, SOLUTION INTRAVENOUS ONCE
Status: COMPLETED | OUTPATIENT
Start: 2025-08-07 | End: 2025-08-07

## 2025-08-07 RX ORDER — IPRATROPIUM BROMIDE AND ALBUTEROL SULFATE 2.5; .5 MG/3ML; MG/3ML
3 SOLUTION RESPIRATORY (INHALATION)
Status: DISCONTINUED | OUTPATIENT
Start: 2025-08-07 | End: 2025-08-07 | Stop reason: HOSPADM

## 2025-08-07 RX ORDER — HYDROCODONE BITARTRATE AND ACETAMINOPHEN 5; 325 MG/1; MG/1
1 TABLET ORAL EVERY 4 HOURS PRN
Status: DISCONTINUED | OUTPATIENT
Start: 2025-08-07 | End: 2025-08-07 | Stop reason: HOSPADM

## 2025-08-07 RX ORDER — PHENYLEPHRINE HYDROCHLORIDE 10 MG/ML
INJECTION INTRAVENOUS
Status: DISCONTINUED | OUTPATIENT
Start: 2025-08-07 | End: 2025-08-07

## 2025-08-07 RX ORDER — PROMETHAZINE HYDROCHLORIDE 25 MG/1
25 TABLET ORAL EVERY 6 HOURS PRN
Status: DISCONTINUED | OUTPATIENT
Start: 2025-08-07 | End: 2025-08-07 | Stop reason: HOSPADM

## 2025-08-07 RX ORDER — ONDANSETRON HYDROCHLORIDE 2 MG/ML
4 INJECTION, SOLUTION INTRAVENOUS DAILY PRN
Status: DISCONTINUED | OUTPATIENT
Start: 2025-08-07 | End: 2025-08-07 | Stop reason: HOSPADM

## 2025-08-07 RX ORDER — MORPHINE SULFATE 10 MG/ML
4 INJECTION INTRAMUSCULAR; INTRAVENOUS; SUBCUTANEOUS EVERY 5 MIN PRN
Status: DISCONTINUED | OUTPATIENT
Start: 2025-08-07 | End: 2025-08-07 | Stop reason: HOSPADM

## 2025-08-07 RX ORDER — EPHEDRINE SULFATE 50 MG/ML
INJECTION, SOLUTION INTRAVENOUS
Status: DISCONTINUED | OUTPATIENT
Start: 2025-08-07 | End: 2025-08-07

## 2025-08-07 RX ORDER — HYDROCODONE BITARTRATE AND ACETAMINOPHEN 10; 325 MG/1; MG/1
1 TABLET ORAL EVERY 4 HOURS PRN
Status: DISCONTINUED | OUTPATIENT
Start: 2025-08-07 | End: 2025-08-07 | Stop reason: HOSPADM

## 2025-08-07 RX ORDER — MEPERIDINE HYDROCHLORIDE 25 MG/ML
25 INJECTION INTRAMUSCULAR; INTRAVENOUS; SUBCUTANEOUS EVERY 10 MIN PRN
Status: DISCONTINUED | OUTPATIENT
Start: 2025-08-07 | End: 2025-08-07 | Stop reason: HOSPADM

## 2025-08-07 RX ORDER — HYDRALAZINE HYDROCHLORIDE 20 MG/ML
10 INJECTION INTRAMUSCULAR; INTRAVENOUS ONCE
Status: COMPLETED | OUTPATIENT
Start: 2025-08-07 | End: 2025-08-07

## 2025-08-07 RX ORDER — HYDROMORPHONE HYDROCHLORIDE 2 MG/ML
0.5 INJECTION, SOLUTION INTRAMUSCULAR; INTRAVENOUS; SUBCUTANEOUS EVERY 5 MIN PRN
Status: DISCONTINUED | OUTPATIENT
Start: 2025-08-07 | End: 2025-08-07 | Stop reason: HOSPADM

## 2025-08-07 RX ORDER — DIPHENHYDRAMINE HYDROCHLORIDE 50 MG/ML
25 INJECTION, SOLUTION INTRAMUSCULAR; INTRAVENOUS EVERY 6 HOURS PRN
Status: DISCONTINUED | OUTPATIENT
Start: 2025-08-07 | End: 2025-08-07 | Stop reason: HOSPADM

## 2025-08-07 RX ORDER — CEFAZOLIN 2 G/1
2 INJECTION, POWDER, FOR SOLUTION INTRAMUSCULAR; INTRAVENOUS
Status: DISCONTINUED | OUTPATIENT
Start: 2025-08-07 | End: 2025-08-07 | Stop reason: HOSPADM

## 2025-08-07 RX ORDER — SODIUM CHLORIDE 9 MG/ML
INJECTION, SOLUTION INTRAVENOUS CONTINUOUS
Status: DISCONTINUED | OUTPATIENT
Start: 2025-08-07 | End: 2025-08-07 | Stop reason: HOSPADM

## 2025-08-07 RX ORDER — FENTANYL CITRATE 50 UG/ML
INJECTION, SOLUTION INTRAMUSCULAR; INTRAVENOUS
Status: DISCONTINUED | OUTPATIENT
Start: 2025-08-07 | End: 2025-08-07

## 2025-08-07 RX ORDER — ONDANSETRON 4 MG/1
8 TABLET, ORALLY DISINTEGRATING ORAL EVERY 8 HOURS PRN
Status: DISCONTINUED | OUTPATIENT
Start: 2025-08-07 | End: 2025-08-07 | Stop reason: HOSPADM

## 2025-08-07 RX ORDER — ACETAMINOPHEN 500 MG
1000 TABLET ORAL EVERY 6 HOURS PRN
Status: DISCONTINUED | OUTPATIENT
Start: 2025-08-07 | End: 2025-08-07 | Stop reason: HOSPADM

## 2025-08-07 RX ORDER — LIDOCAINE HYDROCHLORIDE 20 MG/ML
INJECTION, SOLUTION EPIDURAL; INFILTRATION; INTRACAUDAL; PERINEURAL
Status: DISCONTINUED | OUTPATIENT
Start: 2025-08-07 | End: 2025-08-07

## 2025-08-07 RX ORDER — TRAMADOL HYDROCHLORIDE 50 MG/1
50 TABLET, FILM COATED ORAL EVERY 8 HOURS PRN
Qty: 28 TABLET | Refills: 0 | Status: SHIPPED | OUTPATIENT
Start: 2025-08-07

## 2025-08-07 RX ORDER — SODIUM CHLORIDE, SODIUM LACTATE, POTASSIUM CHLORIDE, CALCIUM CHLORIDE 600; 310; 30; 20 MG/100ML; MG/100ML; MG/100ML; MG/100ML
125 INJECTION, SOLUTION INTRAVENOUS CONTINUOUS
Status: DISCONTINUED | OUTPATIENT
Start: 2025-08-07 | End: 2025-08-07 | Stop reason: HOSPADM

## 2025-08-07 RX ORDER — PROPOFOL 10 MG/ML
VIAL (ML) INTRAVENOUS
Status: DISCONTINUED | OUTPATIENT
Start: 2025-08-07 | End: 2025-08-07

## 2025-08-07 RX ADMIN — HYDROMORPHONE HYDROCHLORIDE 0.5 MG: 2 INJECTION INTRAMUSCULAR; INTRAVENOUS; SUBCUTANEOUS at 01:08

## 2025-08-07 RX ADMIN — LABETALOL HYDROCHLORIDE 10 MG: 5 INJECTION, SOLUTION INTRAVENOUS at 01:08

## 2025-08-07 RX ADMIN — PHENYLEPHRINE HYDROCHLORIDE 200 MCG: 10 INJECTION INTRAVENOUS at 11:08

## 2025-08-07 RX ADMIN — HYDRALAZINE HYDROCHLORIDE 10 MG: 20 INJECTION INTRAMUSCULAR; INTRAVENOUS at 01:08

## 2025-08-07 RX ADMIN — CEFAZOLIN 2 G: 1 INJECTION, POWDER, FOR SOLUTION INTRAMUSCULAR; INTRAVENOUS; PARENTERAL at 11:08

## 2025-08-07 RX ADMIN — PHENYLEPHRINE HYDROCHLORIDE 100 MCG: 10 INJECTION INTRAVENOUS at 12:08

## 2025-08-07 RX ADMIN — EPHEDRINE SULFATE 50 MG: 50 INJECTION INTRAVENOUS at 12:08

## 2025-08-07 RX ADMIN — SODIUM CHLORIDE: 9 INJECTION, SOLUTION INTRAVENOUS at 09:08

## 2025-08-07 RX ADMIN — PHENYLEPHRINE HYDROCHLORIDE 200 MCG: 10 INJECTION INTRAVENOUS at 12:08

## 2025-08-07 RX ADMIN — LIDOCAINE HYDROCHLORIDE 100 MG: 20 INJECTION, SOLUTION INTRAVENOUS at 11:08

## 2025-08-07 RX ADMIN — PROPOFOL 150 MG: 10 INJECTION, EMULSION INTRAVENOUS at 11:08

## 2025-08-07 RX ADMIN — FENTANYL CITRATE 100 MCG: 50 INJECTION, SOLUTION INTRAMUSCULAR; INTRAVENOUS at 11:08

## 2025-08-07 NOTE — PROGRESS NOTES
Radiology Interpretation        Patient Name: Rufus Preston  Date: 8/7/2025  YOB: 1965  MRN# [id number]        ORDERING DIAGNOSIS:    Encounter Diagnoses   Name Primary?    Chronic elbow pain, right Yes    Rupture of right triceps tendon, initial encounter     Pre-operative cardiovascular examination       Two views right elbow skeletally mature individual there is normal mineralization there is a small junie of bone off the tip of the olecranon possible avulsion injury from the triceps no obvious fractures of the olecranon with the condyle was noted impression possible avulsion injury tip of the olecranon                 Marco Culver MD

## 2025-08-07 NOTE — TRANSFER OF CARE
"Anesthesia Transfer of Care Note    Patient: Rufus Preston    Procedure(s) Performed: Procedure(s) (LRB):  REINSERTION, RUPTURED TRICEPS TENDON, AT ELBOW (Right)    Patient location: PACU    Anesthesia Type: general    Transport from OR: Transported from OR on room air with adequate spontaneous ventilation    Post pain: adequate analgesia    Post assessment: no apparent anesthetic complications    Post vital signs: stable    Level of consciousness: sedated    Nausea/Vomiting: no nausea/vomiting    Complications: none    Transfer of care protocol was followed      Last vitals: Visit Vitals  BP (!) 194/109   Pulse 107   Temp 37.1 °C (98.7 °F) (Oral)   Resp 14   Ht 6' 3" (1.905 m)   Wt 116.6 kg (257 lb)   SpO2 (!) 94%   BMI 32.12 kg/m²     "

## 2025-08-07 NOTE — ANESTHESIA POSTPROCEDURE EVALUATION
Anesthesia Post Evaluation    Patient: Rufus Preston    Procedure(s) Performed: Procedure(s) (LRB):  REINSERTION, RUPTURED TRICEPS TENDON, AT ELBOW (Right)    Final Anesthesia Type: general      Patient location during evaluation: PACU  Patient participation: Yes- Able to Participate  Level of consciousness: awake and sedated  Post-procedure vital signs: reviewed and stable  Pain management: adequate  Airway patency: patent    PONV status at discharge: No PONV  Anesthetic complications: no      Cardiovascular status: blood pressure returned to baseline  Respiratory status: unassisted  Hydration status: euvolemic  Follow-up not needed.              Vitals Value Taken Time   /130 08/07/25 13:33   Temp 37.1 °C (98.7 °F) 08/07/25 13:01   Pulse 88 08/07/25 13:33   Resp 14 08/07/25 13:27   SpO2 95 % 08/07/25 13:33   Vitals shown include unfiled device data.      No case tracking events are documented in the log.      Pain/Ramirez Score: Pain Rating Prior to Med Admin: 7 (8/7/2025  1:10 PM)

## 2025-08-07 NOTE — PROGRESS NOTES
Department of Orthopedic Surgery    History and Physical       Principal Problem: Chronic elbow pain, right [M25.521, G89.29]           HISTORY:  60-year-old male with a right elbow injury who has a defect of the triceps difficulty extending of the elbow now needing repair of the right elbow triceps tendon    PAST MEDICAL HISTORY: History reviewed. No pertinent past medical history.     PAST SURGICAL HISTORY:   Past Surgical History:   Procedure Laterality Date    OPEN REDUCTION AND INTERNAL FIXATION (ORIF) OF FRACTURE OF TIBIAL PLATEAU Left 2/11/2025    Procedure: ORIF, FRACTURE, TIBIA, PLATEAU WITH ALLOGRAFT;  Surgeon: Marco Culver MD;  Location: [facility];  Service: Orthopedics;  Laterality: Left;          ALLERGIES: Review of patient's allergies indicates:  No Known Allergies       MEDICATIONS: Prescriptions Prior to Admission[1]     SOCIAL HISTORY: Social History[2]       FAMILY HISTORY: No family history on file.       PHYSICAL EXAM:   Vitals:    08/04/25 1009   BP: (!) 188/98   Pulse: 76     Body mass index is 33.75 kg/m².     In general, this is a well-developed, well-nourished male . The patient is alert, oriented and cooperative.      HEENT:  Normocephalic, atraumatic.  Extraocular movements are intact bilaterally.  The oropharynx is benign.       NECK:  Nontender with good range of motion.      LUNGS:  Clear to auscultation bilaterally.      HEART:  Demonstrates a regular rate and rhythm.  No murmurs appreciated.      ABDOMEN:  Soft, non-tender, non-distended.        EXTREMITIES:  Right upper extremity patient moves his fingers has sensation to touch has palpable pulses at the wrist.  He has palpable defect over the tip of the olecranon.  Tender over his distal triceps tendon.  Pain on resisted extension of the elbow.  He has weakness on attempted extension of the elbow good flexion of the elbow full pronation supination of the forearm      RADIOGRAPHIC FINDINGS:  X-rays show possible  avulsion from the tip of the olecranon      IMPRESSION:  Right triceps tendon rupture      PLAN:  Right triceps tendon repair    I had a long discussion with the patient about treatment options, including operative and nonoperative treatments. We discussed pros and cons of each including risks pertinent to surgery including pain, infection, bleeding, damage to adjacent structures like nerves and blood vessels, failure to heal, need for future surgeries, stiffness, instability, loss of limb, anesthesia risks like stroke, blood clot, loss of life. We discussed the possibility of need for later hardware removal in the case that hardware was used. We discussed common and uncommon risks, and discussed patient specific factors that may increase the risks present with surgery. All questions were answered. The patient expressed understanding of the pros and cons of surgery and wanted to proceed with surgical treatment.        (Subject to voice recognition error, transcription service not allowed)           [1] (Not in a hospital admission)  [2]   Social History  Socioeconomic History    Marital status: Unknown   Tobacco Use    Smoking status: Never    Smokeless tobacco: Never   Substance and Sexual Activity    Alcohol use: Not Currently    Drug use: Not Currently    Sexual activity: Yes     Social Drivers of Health     Financial Resource Strain: Patient Declined (2/12/2025)    Overall Financial Resource Strain (CARDIA)     Difficulty of Paying Living Expenses: Patient declined   Food Insecurity: Patient Declined (2/12/2025)    Hunger Vital Sign     Worried About Running Out of Food in the Last Year: Patient declined     Ran Out of Food in the Last Year: Patient declined   Stress: Patient Declined (2/12/2025)    [employer] - Occupational Stress Questionnaire     Feeling of Stress : Patient declined   Housing Stability: Patient Declined (2/12/2025)    Housing Stability Vital Sign     Unable to  Pay for Housing in the Last Year: Patient declined     Homeless in the Last Year: Patient declined

## 2025-08-07 NOTE — ANESTHESIA PREPROCEDURE EVALUATION
08/07/2025  Rufus Preston is a 60 y.o., male.      Pre-op Assessment    I have reviewed the Patient Summary Reports.     I have reviewed the Nursing Notes. I have reviewed the NPO Status.   I have reviewed the Medications.     Review of Systems  Anesthesia Hx:  No problems with previous Anesthesia                Social:  Non-Smoker, No Alcohol Use       Hematology/Oncology:  Hematology Normal   Oncology Normal                                   EENT/Dental:  EENT/Dental Normal           Cardiovascular:  Cardiovascular Normal                                              Pulmonary:  Pulmonary Normal                       Renal/:  Renal/ Normal                 Hepatic/GI:  Hepatic/GI Normal                    Musculoskeletal:  Musculoskeletal Normal                Neurological:  Neurology Normal                                      Endocrine:  Endocrine Normal            Dermatological:  Skin Normal    Psych:  Psychiatric Normal                    Physical Exam  General: Well nourished    Airway:  Mallampati: II / II  Mouth Opening: Normal  TM Distance: > 6 cm  Tongue: Normal  Neck ROM: Normal ROM    Chest/Lungs:  Clear to auscultation, Normal Respiratory Rate    Heart:  Rate: Normal  Rhythm: Regular Rhythm        Anesthesia Plan  Type of Anesthesia, risks & benefits discussed:    Anesthesia Type: Gen ETT, Regional  Intra-op Monitoring Plan: Standard ASA Monitors  Post Op Pain Control Plan: multimodal analgesia  Induction:  IV  Informed Consent: Informed consent signed with the Patient and all parties understand the risks and agree with anesthesia plan.  All questions answered. Patient consented to blood products? Yes  ASA Score: 2  Day of Surgery Review of History & Physical: H&P Update referred to the surgeon/provider.I have interviewed and examined the patient. I have reviewed the patient's H&P dated:      Ready For Surgery From Anesthesia Perspective.     .

## 2025-08-10 LAB
OHS QRS DURATION: 104 MS
OHS QTC CALCULATION: 451 MS

## 2025-08-12 ENCOUNTER — TELEPHONE (OUTPATIENT)
Dept: ORTHOPEDICS | Facility: CLINIC | Age: 60
End: 2025-08-12

## 2025-08-20 ENCOUNTER — OFFICE VISIT (OUTPATIENT)
Dept: ORTHOPEDICS | Facility: CLINIC | Age: 60
End: 2025-08-20

## 2025-08-20 VITALS
OXYGEN SATURATION: 99 % | BODY MASS INDEX: 31.96 KG/M2 | WEIGHT: 257.06 LBS | HEART RATE: 73 BPM | HEIGHT: 75 IN | SYSTOLIC BLOOD PRESSURE: 161 MMHG | DIASTOLIC BLOOD PRESSURE: 75 MMHG

## 2025-08-20 DIAGNOSIS — S46.311A RUPTURE OF RIGHT TRICEPS TENDON, INITIAL ENCOUNTER: Primary | ICD-10-CM

## 2025-08-20 PROCEDURE — 99999 PR PBB SHADOW E&M-EST. PATIENT-LVL III: CPT | Mod: PBBFAC,,, | Performed by: ORTHOPAEDIC SURGERY

## 2025-08-20 PROCEDURE — 29065 APPL CST SHO TO HAND LNG ARM: CPT | Mod: S$PBB,58,RT, | Performed by: ORTHOPAEDIC SURGERY

## 2025-08-20 PROCEDURE — 29065 APPL CST SHO TO HAND LNG ARM: CPT | Mod: PBBFAC,58,RT | Performed by: ORTHOPAEDIC SURGERY

## 2025-08-20 PROCEDURE — 99213 OFFICE O/P EST LOW 20 MIN: CPT | Mod: PBBFAC,25 | Performed by: ORTHOPAEDIC SURGERY

## 2025-08-20 PROCEDURE — 99999PBSHW PR PBB SHADOW TECHNICAL ONLY FILED TO HB: Mod: PBBFAC,,,

## 2025-08-20 PROCEDURE — 99024 POSTOP FOLLOW-UP VISIT: CPT | Mod: ,,, | Performed by: ORTHOPAEDIC SURGERY

## 2025-08-20 RX ORDER — CEPHALEXIN 500 MG/1
CAPSULE ORAL
COMMUNITY

## 2025-09-03 ENCOUNTER — OFFICE VISIT (OUTPATIENT)
Dept: ORTHOPEDICS | Facility: CLINIC | Age: 60
End: 2025-09-03

## 2025-09-03 VITALS
DIASTOLIC BLOOD PRESSURE: 80 MMHG | HEART RATE: 73 BPM | BODY MASS INDEX: 31.95 KG/M2 | HEIGHT: 75 IN | OXYGEN SATURATION: 98 % | WEIGHT: 257 LBS | SYSTOLIC BLOOD PRESSURE: 150 MMHG

## 2025-09-03 DIAGNOSIS — S46.311A RUPTURE OF RIGHT TRICEPS TENDON, INITIAL ENCOUNTER: Primary | ICD-10-CM

## 2025-09-03 PROCEDURE — 99024 POSTOP FOLLOW-UP VISIT: CPT | Mod: ,,, | Performed by: ORTHOPAEDIC SURGERY

## 2025-09-03 PROCEDURE — 99999 PR PBB SHADOW E&M-EST. PATIENT-LVL III: CPT | Mod: PBBFAC,,, | Performed by: ORTHOPAEDIC SURGERY

## 2025-09-03 PROCEDURE — 99213 OFFICE O/P EST LOW 20 MIN: CPT | Mod: PBBFAC | Performed by: ORTHOPAEDIC SURGERY

## 2025-09-03 RX ORDER — AMLODIPINE BESYLATE 5 MG/1
TABLET ORAL
COMMUNITY
Start: 2025-09-02

## (undated) DEVICE — GLOVE SENSICARE PI GRN 6.5

## (undated) DEVICE — GLOVE SENSICARE PI SURG 6.5

## (undated) DEVICE — SUT BLU BR 2 TAPERD NDL 1/2

## (undated) DEVICE — TRAY SKIN SCRUB WET PREMIUM

## (undated) DEVICE — BANDAGE PRCAR COMPR 11YDX6IN

## (undated) DEVICE — Device

## (undated) DEVICE — STOCKINETTE TUBULAR 2PL 6 X 4

## (undated) DEVICE — GLOVE SENSICARE PI SURG 7

## (undated) DEVICE — SPONGE COTTON TRAY 4X4IN

## (undated) DEVICE — BANDAGE ESMARK 6INX3YD

## (undated) DEVICE — FREE NEEDLE

## (undated) DEVICE — GLOVE SENSICARE PI GRN 8

## (undated) DEVICE — PAD CAST SPECIALIST STRL 3

## (undated) DEVICE — GLOVE SENSICARE PI SURG 8.5

## (undated) DEVICE — STAPLER SKIN WIDE

## (undated) DEVICE — SUT 2-0 VICRYL / CT-1

## (undated) DEVICE — BANDAGE ACE DOUBLE STER 6IN

## (undated) DEVICE — GLOVE SENSICARE PI SURG 7.5

## (undated) DEVICE — GOWN NONREINF SET-IN SLV 2XL

## (undated) DEVICE — BNDG COFLEX FOAM LF2 ST 4X5YD

## (undated) DEVICE — APPLICATOR CHLORAPREP ORN 26ML

## (undated) DEVICE — GLOVE SENSICARE PI GRN 6

## (undated) DEVICE — SOL NACL IRR 1000ML BTL

## (undated) DEVICE — TOURNIQUET SB QC SP 34X4IN

## (undated) DEVICE — DRAPE INVISISHIELD U 48X52IN

## (undated) DEVICE — BIT DRILL NON LOCK 2.5X216MM

## (undated) DEVICE — SLING ARM STP PAD BLUE XL 9X22

## (undated) DEVICE — GLOVE SENSICARE PI GRN 8.5

## (undated) DEVICE — TIP YANKAUERS BULB NO VENT

## (undated) DEVICE — CAST SMALL OR FROM CAST CART

## (undated) DEVICE — GOWN POLY REINF BRTH SLV XL

## (undated) DEVICE — PENCIL ZIP PEN SMOKE EVAC 10FT

## (undated) DEVICE — GLOVE SENSICARE PI SURG 6

## (undated) DEVICE — GLOVE SENSICARE PI GRN 7

## (undated) DEVICE — KWIRE DRILL TIP 2X234MM
Type: IMPLANTABLE DEVICE | Site: LEG | Status: NON-FUNCTIONAL
Removed: 2025-02-11

## (undated) DEVICE — SOCKINETTE DOUBLE PLY 4X48IN

## (undated) DEVICE — BAG RECTANGLE RBBRBND 30X36IN

## (undated) DEVICE — BIT DRILL LOCK SHORT 3.1X216MM